# Patient Record
Sex: FEMALE | Race: WHITE | Employment: FULL TIME | URBAN - METROPOLITAN AREA
[De-identification: names, ages, dates, MRNs, and addresses within clinical notes are randomized per-mention and may not be internally consistent; named-entity substitution may affect disease eponyms.]

---

## 2023-02-17 PROBLEM — M54.9 CHRONIC BACK PAIN: Status: ACTIVE | Noted: 2023-02-17

## 2023-02-17 PROBLEM — G47.33 OBSTRUCTIVE SLEEP APNEA: Status: ACTIVE | Noted: 2023-02-17

## 2023-02-17 PROBLEM — I10 ESSENTIAL HYPERTENSION: Status: ACTIVE | Noted: 2023-02-17

## 2023-02-17 PROBLEM — G47.20 CIRCADIAN RHYTHM SLEEP DISORDER OF NONORGANIC ORIGIN: Status: ACTIVE | Noted: 2023-02-17

## 2023-02-17 PROBLEM — R91.8 MULTIPLE NODULES OF LUNG: Status: ACTIVE | Noted: 2023-02-17

## 2023-02-17 PROBLEM — R42 POSTURAL DIZZINESS: Status: ACTIVE | Noted: 2023-02-17

## 2023-02-17 PROBLEM — F32.A DEPRESSION: Status: ACTIVE | Noted: 2023-02-17

## 2023-02-17 PROBLEM — K76.89 LIVER CYST: Status: ACTIVE | Noted: 2023-02-17

## 2023-02-17 PROBLEM — G47.31 CENTRAL SLEEP APNEA: Status: ACTIVE | Noted: 2023-02-17

## 2023-02-17 PROBLEM — F41.9 ANXIETY: Status: ACTIVE | Noted: 2023-02-17

## 2023-02-17 PROBLEM — R42 DIZZINESS: Status: ACTIVE | Noted: 2023-02-17

## 2023-02-17 PROBLEM — E66.9 OBESITY: Status: ACTIVE | Noted: 2023-02-17

## 2023-02-17 PROBLEM — G89.29 CHRONIC BACK PAIN: Status: ACTIVE | Noted: 2023-02-17

## 2023-02-17 PROBLEM — F51.04 CHRONIC INSOMNIA: Status: ACTIVE | Noted: 2023-02-17

## 2023-02-17 PROBLEM — G47.09 OTHER INSOMNIA: Status: ACTIVE | Noted: 2023-02-17

## 2023-02-17 PROBLEM — R42 INTERMITTENT LIGHTHEADEDNESS: Status: ACTIVE | Noted: 2023-02-17

## 2023-02-17 PROBLEM — J45.909 REACTIVE AIRWAY DISEASE (HHS-HCC): Status: ACTIVE | Noted: 2023-02-17

## 2023-02-17 PROBLEM — F51.8 CIRCADIAN RHYTHM SLEEP DISORDER OF NONORGANIC ORIGIN: Status: ACTIVE | Noted: 2023-02-17

## 2023-02-17 PROBLEM — R26.89 BALANCE PROBLEM: Status: ACTIVE | Noted: 2023-02-17

## 2023-02-17 PROBLEM — S86.912A: Status: ACTIVE | Noted: 2023-02-17

## 2023-02-17 PROBLEM — Z92.89 H/O CARDIOVASCULAR STRESS TEST: Status: RESOLVED | Noted: 2023-02-17 | Resolved: 2023-02-17

## 2023-02-17 PROBLEM — R05.9 COUGH: Status: ACTIVE | Noted: 2023-02-17

## 2023-02-17 PROBLEM — J30.9 ALLERGIC RHINITIS: Status: ACTIVE | Noted: 2023-02-17

## 2023-02-17 PROBLEM — R91.1 LUNG NODULE: Status: ACTIVE | Noted: 2023-02-17

## 2023-02-17 PROBLEM — R55 NEAR SYNCOPE: Status: ACTIVE | Noted: 2023-02-17

## 2023-02-17 RX ORDER — AMLODIPINE BESYLATE 5 MG/1
1 TABLET ORAL DAILY
COMMUNITY
Start: 2012-03-21 | End: 2023-06-28 | Stop reason: SDUPTHER

## 2023-02-17 RX ORDER — CHOLECALCIFEROL (VITAMIN D3) 50 MCG
50 TABLET ORAL DAILY
COMMUNITY

## 2023-02-17 RX ORDER — FLUTICASONE PROPIONATE 50 MCG
SPRAY, SUSPENSION (ML) NASAL
COMMUNITY
Start: 2020-11-30

## 2023-02-17 RX ORDER — MINERAL OIL
ENEMA (ML) RECTAL
COMMUNITY
Start: 2020-11-30

## 2023-02-17 RX ORDER — LAMOTRIGINE 25 MG/1
TABLET ORAL
COMMUNITY
Start: 2020-11-30

## 2023-02-17 RX ORDER — GUAIFENESIN AND DEXTROMETHORPHAN HYDROBROMIDE 1200; 60 MG/1; MG/1
TABLET, EXTENDED RELEASE ORAL
COMMUNITY
Start: 2019-04-09

## 2023-02-17 RX ORDER — ACEBUTOLOL HYDROCHLORIDE 200 MG/1
1 CAPSULE ORAL DAILY
COMMUNITY
Start: 2016-09-13 | End: 2023-06-20

## 2023-02-17 RX ORDER — CLONAZEPAM 1 MG/1
0.5 TABLET ORAL
COMMUNITY
Start: 2016-09-13 | End: 2024-05-01 | Stop reason: SDUPTHER

## 2023-02-17 RX ORDER — ACETAMINOPHEN 160 MG/5ML
1 SUSPENSION, ORAL (FINAL DOSE FORM) ORAL DAILY
COMMUNITY
Start: 2019-04-09

## 2023-02-17 RX ORDER — ARIPIPRAZOLE 15 MG/1
7.5 TABLET ORAL DAILY
COMMUNITY
Start: 2022-07-14

## 2023-02-17 RX ORDER — ESCITALOPRAM OXALATE 20 MG/1
1 TABLET ORAL DAILY
COMMUNITY
Start: 2021-08-31

## 2023-03-30 ENCOUNTER — OFFICE VISIT (OUTPATIENT)
Dept: PRIMARY CARE | Facility: CLINIC | Age: 63
End: 2023-03-30
Payer: COMMERCIAL

## 2023-03-30 VITALS
OXYGEN SATURATION: 96 % | HEART RATE: 66 BPM | SYSTOLIC BLOOD PRESSURE: 128 MMHG | RESPIRATION RATE: 16 BRPM | TEMPERATURE: 97.5 F | DIASTOLIC BLOOD PRESSURE: 78 MMHG | BODY MASS INDEX: 35.97 KG/M2 | WEIGHT: 243.6 LBS

## 2023-03-30 DIAGNOSIS — F32.0 CURRENT MILD EPISODE OF MAJOR DEPRESSIVE DISORDER WITHOUT PRIOR EPISODE (CMS-HCC): ICD-10-CM

## 2023-03-30 DIAGNOSIS — E66.01 SEVERE OBESITY (BMI 35.0-35.9 WITH COMORBIDITY) (MULTI): ICD-10-CM

## 2023-03-30 DIAGNOSIS — G47.33 OBSTRUCTIVE SLEEP APNEA: ICD-10-CM

## 2023-03-30 DIAGNOSIS — I10 ESSENTIAL HYPERTENSION: Primary | ICD-10-CM

## 2023-03-30 DIAGNOSIS — Z12.31 ENCOUNTER FOR SCREENING MAMMOGRAM FOR MALIGNANT NEOPLASM OF BREAST: ICD-10-CM

## 2023-03-30 PROBLEM — F41.9 ANXIETY: Status: RESOLVED | Noted: 2023-02-17 | Resolved: 2023-03-30

## 2023-03-30 PROBLEM — G89.29 CHRONIC BACK PAIN: Status: RESOLVED | Noted: 2023-02-17 | Resolved: 2023-03-30

## 2023-03-30 PROBLEM — M54.9 CHRONIC BACK PAIN: Status: RESOLVED | Noted: 2023-02-17 | Resolved: 2023-03-30

## 2023-03-30 PROBLEM — R55 NEAR SYNCOPE: Status: RESOLVED | Noted: 2023-02-17 | Resolved: 2023-03-30

## 2023-03-30 PROBLEM — R42 INTERMITTENT LIGHTHEADEDNESS: Status: RESOLVED | Noted: 2023-02-17 | Resolved: 2023-03-30

## 2023-03-30 PROBLEM — S86.912A: Status: RESOLVED | Noted: 2023-02-17 | Resolved: 2023-03-30

## 2023-03-30 PROBLEM — R42 POSTURAL DIZZINESS: Status: RESOLVED | Noted: 2023-02-17 | Resolved: 2023-03-30

## 2023-03-30 PROBLEM — G47.31 CENTRAL SLEEP APNEA: Status: RESOLVED | Noted: 2023-02-17 | Resolved: 2023-03-30

## 2023-03-30 PROBLEM — R42 DIZZINESS: Status: RESOLVED | Noted: 2023-02-17 | Resolved: 2023-03-30

## 2023-03-30 PROBLEM — R26.89 BALANCE PROBLEM: Status: RESOLVED | Noted: 2023-02-17 | Resolved: 2023-03-30

## 2023-03-30 PROCEDURE — 99214 OFFICE O/P EST MOD 30 MIN: CPT | Performed by: INTERNAL MEDICINE

## 2023-03-30 PROCEDURE — 3074F SYST BP LT 130 MM HG: CPT | Performed by: INTERNAL MEDICINE

## 2023-03-30 PROCEDURE — 3008F BODY MASS INDEX DOCD: CPT | Performed by: INTERNAL MEDICINE

## 2023-03-30 PROCEDURE — 3078F DIAST BP <80 MM HG: CPT | Performed by: INTERNAL MEDICINE

## 2023-03-30 PROCEDURE — 1036F TOBACCO NON-USER: CPT | Performed by: INTERNAL MEDICINE

## 2023-03-30 ASSESSMENT — ENCOUNTER SYMPTOMS
PALPITATIONS: 0
SHORTNESS OF BREATH: 0
APPETITE CHANGE: 0
ACTIVITY CHANGE: 0
COUGH: 0

## 2023-03-30 NOTE — PROGRESS NOTES
The patient is here today for a follow up  appointment.Subjective   Patient ID: Romi Vicente is a 62 y.o. female who presents for Follow-up.  Here for follow up  Feels well  No complaints    Has been crafting again        Review of Systems   Constitutional:  Negative for activity change and appetite change.   Respiratory:  Negative for cough and shortness of breath.    Cardiovascular:  Negative for chest pain, palpitations and leg swelling.       Objective   Physical Exam  Vitals and nursing note reviewed.   Cardiovascular:      Rate and Rhythm: Normal rate and regular rhythm.   Pulmonary:      Effort: Pulmonary effort is normal.      Breath sounds: Normal breath sounds.         Assessment/Plan   Problem List Items Addressed This Visit       Obstructive sleep apnea    Overview     PSG at  revealed moderate HONEY with an AHI 27.9 O2 sat behzad 81%         Depression    Current Assessment & Plan     Stable         Essential hypertension - Primary    Current Assessment & Plan     Stable         Severe obesity (BMI 35.0-35.9 with comorbidity) (CMS/HCC)    Current Assessment & Plan     Applauded success          Other Visit Diagnoses       Encounter for screening mammogram for malignant neoplasm of breast

## 2023-06-20 DIAGNOSIS — I10 ESSENTIAL HYPERTENSION: Primary | ICD-10-CM

## 2023-06-20 RX ORDER — ACEBUTOLOL HYDROCHLORIDE 200 MG/1
CAPSULE ORAL DAILY
Qty: 90 CAPSULE | Refills: 3 | Status: SHIPPED | OUTPATIENT
Start: 2023-06-20 | End: 2023-08-03 | Stop reason: SDUPTHER

## 2023-06-28 DIAGNOSIS — Z00.00 HEALTHCARE MAINTENANCE: ICD-10-CM

## 2023-06-28 DIAGNOSIS — Z00.00 HEALTH MAINTENANCE EXAMINATION: Primary | ICD-10-CM

## 2023-06-28 DIAGNOSIS — I10 ESSENTIAL HYPERTENSION: ICD-10-CM

## 2023-06-28 RX ORDER — AMLODIPINE BESYLATE 5 MG/1
5 TABLET ORAL DAILY
Qty: 90 TABLET | Refills: 1 | Status: SHIPPED | OUTPATIENT
Start: 2023-06-28 | End: 2023-08-03 | Stop reason: SDUPTHER

## 2023-08-02 ENCOUNTER — LAB (OUTPATIENT)
Dept: LAB | Facility: LAB | Age: 63
End: 2023-08-02
Payer: COMMERCIAL

## 2023-08-02 DIAGNOSIS — Z00.00 HEALTHCARE MAINTENANCE: ICD-10-CM

## 2023-08-02 DIAGNOSIS — Z00.00 HEALTH MAINTENANCE EXAMINATION: ICD-10-CM

## 2023-08-02 LAB
ALANINE AMINOTRANSFERASE (SGPT) (U/L) IN SER/PLAS: 48 U/L (ref 7–45)
ALBUMIN (G/DL) IN SER/PLAS: 4.1 G/DL (ref 3.4–5)
ALKALINE PHOSPHATASE (U/L) IN SER/PLAS: 120 U/L (ref 33–136)
ANION GAP IN SER/PLAS: 11 MMOL/L (ref 10–20)
ASPARTATE AMINOTRANSFERASE (SGOT) (U/L) IN SER/PLAS: 26 U/L (ref 9–39)
BILIRUBIN TOTAL (MG/DL) IN SER/PLAS: 0.6 MG/DL (ref 0–1.2)
CALCIUM (MG/DL) IN SER/PLAS: 9.5 MG/DL (ref 8.6–10.3)
CARBON DIOXIDE, TOTAL (MMOL/L) IN SER/PLAS: 29 MMOL/L (ref 21–32)
CHLORIDE (MMOL/L) IN SER/PLAS: 105 MMOL/L (ref 98–107)
CHOLESTEROL (MG/DL) IN SER/PLAS: 159 MG/DL (ref 0–199)
CHOLESTEROL IN HDL (MG/DL) IN SER/PLAS: 46 MG/DL
CHOLESTEROL/HDL RATIO: 3.5
CREATININE (MG/DL) IN SER/PLAS: 1.08 MG/DL (ref 0.5–1.05)
ERYTHROCYTE DISTRIBUTION WIDTH (RATIO) BY AUTOMATED COUNT: 13 % (ref 11.5–14.5)
ERYTHROCYTE MEAN CORPUSCULAR HEMOGLOBIN CONCENTRATION (G/DL) BY AUTOMATED: 32.6 G/DL (ref 32–36)
ERYTHROCYTE MEAN CORPUSCULAR VOLUME (FL) BY AUTOMATED COUNT: 91 FL (ref 80–100)
ERYTHROCYTES (10*6/UL) IN BLOOD BY AUTOMATED COUNT: 4.69 X10E12/L (ref 4–5.2)
ESTIMATED AVERAGE GLUCOSE FOR HBA1C: 117 MG/DL
GFR FEMALE: 58 ML/MIN/1.73M2
GLUCOSE (MG/DL) IN SER/PLAS: 89 MG/DL (ref 74–99)
HEMATOCRIT (%) IN BLOOD BY AUTOMATED COUNT: 42.7 % (ref 36–46)
HEMOGLOBIN (G/DL) IN BLOOD: 13.9 G/DL (ref 12–16)
HEMOGLOBIN A1C/HEMOGLOBIN TOTAL IN BLOOD: 5.7 %
LDL: 84 MG/DL (ref 0–99)
LEUKOCYTES (10*3/UL) IN BLOOD BY AUTOMATED COUNT: 5.5 X10E9/L (ref 4.4–11.3)
PLATELETS (10*3/UL) IN BLOOD AUTOMATED COUNT: 220 X10E9/L (ref 150–450)
POTASSIUM (MMOL/L) IN SER/PLAS: 4.2 MMOL/L (ref 3.5–5.3)
PROTEIN TOTAL: 6.7 G/DL (ref 6.4–8.2)
SODIUM (MMOL/L) IN SER/PLAS: 141 MMOL/L (ref 136–145)
THYROTROPIN (MIU/L) IN SER/PLAS BY DETECTION LIMIT <= 0.05 MIU/L: 3.81 MIU/L (ref 0.44–3.98)
TRIGLYCERIDE (MG/DL) IN SER/PLAS: 143 MG/DL (ref 0–149)
UREA NITROGEN (MG/DL) IN SER/PLAS: 16 MG/DL (ref 6–23)
VLDL: 29 MG/DL (ref 0–40)

## 2023-08-02 PROCEDURE — 36415 COLL VENOUS BLD VENIPUNCTURE: CPT

## 2023-08-02 PROCEDURE — 80053 COMPREHEN METABOLIC PANEL: CPT

## 2023-08-02 PROCEDURE — 85027 COMPLETE CBC AUTOMATED: CPT

## 2023-08-02 PROCEDURE — 80061 LIPID PANEL: CPT

## 2023-08-02 PROCEDURE — 83036 HEMOGLOBIN GLYCOSYLATED A1C: CPT

## 2023-08-02 PROCEDURE — 84443 ASSAY THYROID STIM HORMONE: CPT

## 2023-08-03 ENCOUNTER — OFFICE VISIT (OUTPATIENT)
Dept: PRIMARY CARE | Facility: CLINIC | Age: 63
End: 2023-08-03
Payer: COMMERCIAL

## 2023-08-03 VITALS
WEIGHT: 240 LBS | OXYGEN SATURATION: 97 % | HEIGHT: 69 IN | HEART RATE: 70 BPM | RESPIRATION RATE: 16 BRPM | DIASTOLIC BLOOD PRESSURE: 74 MMHG | SYSTOLIC BLOOD PRESSURE: 122 MMHG | BODY MASS INDEX: 35.55 KG/M2 | TEMPERATURE: 97.2 F

## 2023-08-03 DIAGNOSIS — F32.A DEPRESSION, UNSPECIFIED DEPRESSION TYPE: Primary | ICD-10-CM

## 2023-08-03 DIAGNOSIS — Z00.00 HEALTH CARE MAINTENANCE: ICD-10-CM

## 2023-08-03 DIAGNOSIS — I10 ESSENTIAL HYPERTENSION: ICD-10-CM

## 2023-08-03 PROCEDURE — 1036F TOBACCO NON-USER: CPT | Performed by: INTERNAL MEDICINE

## 2023-08-03 PROCEDURE — 99396 PREV VISIT EST AGE 40-64: CPT | Performed by: INTERNAL MEDICINE

## 2023-08-03 PROCEDURE — 3078F DIAST BP <80 MM HG: CPT | Performed by: INTERNAL MEDICINE

## 2023-08-03 PROCEDURE — 3008F BODY MASS INDEX DOCD: CPT | Performed by: INTERNAL MEDICINE

## 2023-08-03 PROCEDURE — 3074F SYST BP LT 130 MM HG: CPT | Performed by: INTERNAL MEDICINE

## 2023-08-03 PROCEDURE — 93000 ELECTROCARDIOGRAM COMPLETE: CPT | Performed by: INTERNAL MEDICINE

## 2023-08-03 RX ORDER — ACEBUTOLOL HYDROCHLORIDE 200 MG/1
200 CAPSULE ORAL DAILY
Qty: 90 CAPSULE | Refills: 3 | Status: SHIPPED | OUTPATIENT
Start: 2023-08-03

## 2023-08-03 RX ORDER — LAMOTRIGINE 200 MG/1
TABLET ORAL
COMMUNITY

## 2023-08-03 RX ORDER — AMLODIPINE BESYLATE 5 MG/1
5 TABLET ORAL DAILY
Qty: 90 TABLET | Refills: 3 | Status: SHIPPED | OUTPATIENT
Start: 2023-08-03

## 2023-08-03 ASSESSMENT — PATIENT HEALTH QUESTIONNAIRE - PHQ9
2. FEELING DOWN, DEPRESSED OR HOPELESS: NOT AT ALL
1. LITTLE INTEREST OR PLEASURE IN DOING THINGS: NOT AT ALL
SUM OF ALL RESPONSES TO PHQ9 QUESTIONS 1 AND 2: 0

## 2023-08-03 ASSESSMENT — ENCOUNTER SYMPTOMS
CONSTIPATION: 0
NAUSEA: 0
VOICE CHANGE: 0
PALPITATIONS: 0
HEMATURIA: 0
TROUBLE SWALLOWING: 0
DIFFICULTY URINATING: 0
DIARRHEA: 0
ACTIVITY CHANGE: 0
APPETITE CHANGE: 0
WHEEZING: 0
HEADACHES: 0
UNEXPECTED WEIGHT CHANGE: 0
BLOOD IN STOOL: 0
DIZZINESS: 0
VOMITING: 0
COUGH: 0
LIGHT-HEADEDNESS: 0
FATIGUE: 0
CHEST TIGHTNESS: 0
ARTHRALGIAS: 0
TREMORS: 0

## 2023-08-03 NOTE — PROGRESS NOTES
Received notification by Isabela Rogers RN on 7/12/21 that pt has triggered for oncology nutrition care (reason for referral: Malnutrition Screening Tool (MST) Triggers: scored a 3 indicating 24-33# (11-15 kg) recent wt loss and is not eating poorly due to a decreased appetite  (Date of MST: 7/12/21))  Contacted Debi's daughter Kieran Poole today to establish care  No answer  Left voice message with the reason for today's call and this RDs contact information asking that she call back as able/desired  The patient is here today for a physical exam.Subjective   Patient ID: Romi Vicente is a 63 y.o. female who presents for Annual Exam.  64 yo here for a physical  Started Noom last week - lost 3 lbs last week    Works at [employer]  Single  Nonsmoker  Alcohol- occ  Marijuana- denies  No exercise      Review of Systems   Constitutional:  Negative for activity change, appetite change, fatigue and unexpected weight change.   HENT:  Negative for ear pain, hearing loss, tinnitus, trouble swallowing and voice change.    Eyes:  Negative for visual disturbance.   Respiratory:  Negative for cough, chest tightness and wheezing.    Cardiovascular:  Negative for chest pain, palpitations and leg swelling.   Gastrointestinal:  Negative for blood in stool, constipation, diarrhea, nausea and vomiting.   Genitourinary:  Negative for difficulty urinating, hematuria and vaginal bleeding.   Musculoskeletal:  Negative for arthralgias.   Skin:  Negative for rash.   Neurological:  Negative for dizziness, tremors, syncope, light-headedness and headaches.       Objective   Physical Exam  Constitutional:       General: She is not in acute distress.     Appearance: She is well-developed. She is not diaphoretic.   HENT:      Head: Normocephalic.      Right Ear: Tympanic membrane normal. There is no impacted cerumen.      Left Ear: Tympanic membrane normal. There is no impacted cerumen.      Nose: Nose normal.      Mouth/Throat:      Mouth: Mucous membranes are moist.      Pharynx: Oropharynx is clear. No oropharyngeal exudate or posterior oropharyngeal erythema.   Eyes:      General: No scleral icterus.     Extraocular Movements: Extraocular movements intact.      Conjunctiva/sclera: Conjunctivae normal.      Pupils: Pupils are equal, round, and reactive to light.   Neck:      Thyroid: No thyromegaly.      Vascular: No JVD.   Cardiovascular:      Rate and Rhythm: Normal rate and regular rhythm.      Pulses: Normal pulses.      Heart sounds:  Normal heart sounds. No murmur heard.     No friction rub. No gallop.   Pulmonary:      Effort: Pulmonary effort is normal. No respiratory distress.      Breath sounds: Normal breath sounds. No wheezing or rales.   Chest:      Chest wall: No tenderness.   Abdominal:      General: Bowel sounds are normal. There is no distension.      Palpations: Abdomen is soft. There is no mass.      Tenderness: There is no abdominal tenderness. There is no rebound.   Musculoskeletal:         General: Normal range of motion.      Cervical back: Normal range of motion and neck supple.   Lymphadenopathy:      Cervical: No cervical adenopathy.   Skin:     General: Skin is warm and dry.   Neurological:      General: No focal deficit present.      Mental Status: She is alert and oriented to person, place, and time.      Deep Tendon Reflexes: Reflexes normal.   Psychiatric:         Mood and Affect: Mood normal.         Thought Content: Thought content normal.       Assessment/Plan   Problem List Items Addressed This Visit       Depression - Primary    Current Assessment & Plan     Sees psychiatry         Essential hypertension    Current Assessment & Plan     Stable         Relevant Medications    acebutolol (Sectral) 200 mg capsule    amLODIPine (Norvasc) 5 mg tablet     Other Visit Diagnoses       Health care maintenance        Relevant Orders    ECG 12 lead (Clinic Performed)         Discussed heart healthy lifestyle

## 2024-02-01 ENCOUNTER — OFFICE VISIT (OUTPATIENT)
Dept: PRIMARY CARE | Facility: CLINIC | Age: 64
End: 2024-02-01
Payer: COMMERCIAL

## 2024-02-01 VITALS
BODY MASS INDEX: 37.29 KG/M2 | WEIGHT: 251.8 LBS | TEMPERATURE: 97.1 F | OXYGEN SATURATION: 95 % | DIASTOLIC BLOOD PRESSURE: 79 MMHG | SYSTOLIC BLOOD PRESSURE: 140 MMHG | HEIGHT: 69 IN | HEART RATE: 67 BPM

## 2024-02-01 DIAGNOSIS — Z13.5 DIABETIC RETINOPATHY SCREENING: Primary | ICD-10-CM

## 2024-02-01 PROBLEM — H66.90 ACUTE OTITIS MEDIA: Status: ACTIVE | Noted: 2024-02-01

## 2024-02-01 PROBLEM — R31.9 HEMATURIA: Status: ACTIVE | Noted: 2024-02-01

## 2024-02-01 PROBLEM — Z86.59 HISTORY OF DEPRESSION: Status: ACTIVE | Noted: 2024-02-01

## 2024-02-01 PROBLEM — Z20.822 SUSPECTED SEVERE ACUTE RESPIRATORY SYNDROME CORONAVIRUS 2 (SARS-COV-2) INFECTION: Status: ACTIVE | Noted: 2022-07-14

## 2024-02-01 PROBLEM — Z86.79 HISTORY OF HYPERTENSION: Status: ACTIVE | Noted: 2024-02-01

## 2024-02-01 LAB — POC HEMOGLOBIN A1C: 6.4 % (ref 4.2–6.5)

## 2024-02-01 PROCEDURE — 1036F TOBACCO NON-USER: CPT | Performed by: INTERNAL MEDICINE

## 2024-02-01 PROCEDURE — 83036 HEMOGLOBIN GLYCOSYLATED A1C: CPT | Performed by: INTERNAL MEDICINE

## 2024-02-01 PROCEDURE — 3077F SYST BP >= 140 MM HG: CPT | Performed by: INTERNAL MEDICINE

## 2024-02-01 PROCEDURE — 3008F BODY MASS INDEX DOCD: CPT | Performed by: INTERNAL MEDICINE

## 2024-02-01 PROCEDURE — 3078F DIAST BP <80 MM HG: CPT | Performed by: INTERNAL MEDICINE

## 2024-02-01 PROCEDURE — 99215 OFFICE O/P EST HI 40 MIN: CPT | Performed by: INTERNAL MEDICINE

## 2024-02-01 ASSESSMENT — PATIENT HEALTH QUESTIONNAIRE - PHQ9
SUM OF ALL RESPONSES TO PHQ9 QUESTIONS 1 AND 2: 0
1. LITTLE INTEREST OR PLEASURE IN DOING THINGS: NOT AT ALL
2. FEELING DOWN, DEPRESSED OR HOPELESS: NOT AT ALL

## 2024-02-01 NOTE — PROGRESS NOTES
"Subjective   Patient ID: Romi Vicente is a 63 y.o. female who presents for Follow-up.    HPI   Patient is a 64 yo F who presents for follow-up.    Feels well, but is concerned about weight. She has gained 11 pounds since last visit in August. Was using Noom in August and had lost and maintained weight at 240 until Thanksgiving then felt like she hit a plateau. She quit using Noom over the holidays and diet suffered. She has also not been exercising. Feels like she has an issue with binge eating, psych had suggested naltrexone for addictive eating. Patient not interested in being on weight loss medications.    Review of Systems  Negative except as noted in HPI    Objective   /79   Pulse 67   Temp 36.2 °C (97.1 °F)   Ht 1.759 m (5' 9.25\")   Wt 114 kg (251 lb 12.8 oz)   SpO2 95%   BMI 36.92 kg/m²     Physical Exam  Constitutional: NAD, pleasant  HEENT: EOMI, no scleral icterus  CV: RRR, no m/r/g  Pulm: CTAB, no increased WOB  GI: Soft, NT, ND  Extremities: no notable edema  Skin: warm  Neuro: grossly alert and oriented  Psych: Mood and affect appropriate to situation     Assessment/Plan   Patient is a 64 yo F who presents for follow-up.    #Obestiy  -weight today 251 from 240 in August  -discussed extensively weight loss strategies including diet and exerices and well as medication options  -patient interested in restarting noom again and holidng off on meds  -will reassess in 3 months    #Pre-Diabetic  -A1c in 8/23 was 5.7, repeat A1c today 6.4  -discussed need for lifestyle change   -repeat A1c in 3 months, if remains 6.4 or higher, will need to begin treatment for DM  -consider GLP-1 at that time for combination weight loss and DM    #Essential HTN  -/79  -continue acebutolol 200mg daily and amlodipine 5mg daily    #Depression  -follows with psych  -could consider resuming Wellbutrin for cravings and mood, discuss with provider    Na Dang MD  IM, PGY1  "

## 2024-02-01 NOTE — PROGRESS NOTES
Subjective   Patient ID: Romi Vicente is a 63 y.o. female who presents for Follow-up.    HPI   Patient is here for 6 months follow up    Review of Systems    Objective   There were no vitals taken for this visit.    Physical Exam    Assessment/Plan

## 2024-02-15 ASSESSMENT — SLEEP AND FATIGUE QUESTIONNAIRES
HOW LIKELY ARE YOU TO NOD OFF OR FALL ASLEEP IN A CAR, WHILE STOPPED FOR A FEW MINUTES IN TRAFFIC: NO CHANCE OF DOZING
HOW LIKELY ARE YOU TO NOD OFF OR FALL ASLEEP WHEN YOU ARE A PASSENGER IN A CAR FOR AN HOUR WITHOUT A BREAK: SLIGHT CHANCE OF DOZING
HOW LIKELY ARE YOU TO NOD OFF OR FALL ASLEEP WHILE WATCHING TV: SLIGHT CHANCE OF DOZING
HOW LIKELY ARE YOU TO NOD OFF OR FALL ASLEEP WHILE SITTING QUIETLY AFTER LUNCH WITHOUT ALCOHOL: SLIGHT CHANCE OF DOZING
HOW LIKELY ARE YOU TO NOD OFF OR FALL ASLEEP WHILE SITTING AND READING: SLIGHT CHANCE OF DOZING
SITING INACTIVE IN A PUBLIC PLACE LIKE A CLASS ROOM OR A MOVIE THEATER: SLIGHT CHANCE OF DOZING
HOW LIKELY ARE YOU TO NOD OFF OR FALL ASLEEP WHILE SITTING AND TALKING TO SOMEONE: NO CHANCE OF DOZING
ESS TOTAL SCORE: 8
HOW LIKELY ARE YOU TO NOD OFF OR FALL ASLEEP WHILE LYING DOWN TO REST IN THE AFTERNOON WHEN CIRCUMSTANCES PERMIT: HIGH CHANCE OF DOZING

## 2024-02-22 ENCOUNTER — OFFICE VISIT (OUTPATIENT)
Dept: SLEEP MEDICINE | Facility: CLINIC | Age: 64
End: 2024-02-22
Payer: COMMERCIAL

## 2024-02-22 VITALS
BODY MASS INDEX: 36.33 KG/M2 | HEIGHT: 69 IN | WEIGHT: 245.3 LBS | SYSTOLIC BLOOD PRESSURE: 108 MMHG | HEART RATE: 74 BPM | OXYGEN SATURATION: 94 % | DIASTOLIC BLOOD PRESSURE: 73 MMHG | RESPIRATION RATE: 18 BRPM

## 2024-02-22 DIAGNOSIS — G47.50 PARASOMNIA, UNSPECIFIED TYPE: ICD-10-CM

## 2024-02-22 DIAGNOSIS — G47.30 SLEEP APNEA, UNSPECIFIED TYPE: Primary | ICD-10-CM

## 2024-02-22 PROCEDURE — 1036F TOBACCO NON-USER: CPT | Performed by: GENERAL PRACTICE

## 2024-02-22 PROCEDURE — 3078F DIAST BP <80 MM HG: CPT | Performed by: GENERAL PRACTICE

## 2024-02-22 PROCEDURE — 99215 OFFICE O/P EST HI 40 MIN: CPT | Performed by: GENERAL PRACTICE

## 2024-02-22 PROCEDURE — 3008F BODY MASS INDEX DOCD: CPT | Performed by: GENERAL PRACTICE

## 2024-02-22 PROCEDURE — 3074F SYST BP LT 130 MM HG: CPT | Performed by: GENERAL PRACTICE

## 2024-02-22 RX ORDER — BUPROPION HYDROCHLORIDE 100 MG/1
100 TABLET ORAL DAILY
COMMUNITY
End: 2024-05-02 | Stop reason: SDUPTHER

## 2024-02-22 ASSESSMENT — COLUMBIA-SUICIDE SEVERITY RATING SCALE - C-SSRS
2. HAVE YOU ACTUALLY HAD ANY THOUGHTS OF KILLING YOURSELF?: NO
1. IN THE PAST MONTH, HAVE YOU WISHED YOU WERE DEAD OR WISHED YOU COULD GO TO SLEEP AND NOT WAKE UP?: NO
6. HAVE YOU EVER DONE ANYTHING, STARTED TO DO ANYTHING, OR PREPARED TO DO ANYTHING TO END YOUR LIFE?: NO

## 2024-02-22 ASSESSMENT — SLEEP AND FATIGUE QUESTIONNAIRES
HOW LIKELY ARE YOU TO NOD OFF OR FALL ASLEEP WHILE SITTING AND READING: SLIGHT CHANCE OF DOZING
HOW LIKELY ARE YOU TO NOD OFF OR FALL ASLEEP WHILE SITTING AND TALKING TO SOMEONE: WOULD NEVER DOZE
HOW LIKELY ARE YOU TO NOD OFF OR FALL ASLEEP WHEN YOU ARE A PASSENGER IN A CAR FOR AN HOUR WITHOUT A BREAK: WOULD NEVER DOZE
HOW LIKELY ARE YOU TO NOD OFF OR FALL ASLEEP WHILE SITTING QUIETLY AFTER LUNCH WITHOUT ALCOHOL: WOULD NEVER DOZE
ESS-CHAD TOTAL SCORE: 7
HOW LIKELY ARE YOU TO NOD OFF OR FALL ASLEEP WHILE LYING DOWN TO REST IN THE AFTERNOON WHEN CIRCUMSTANCES PERMIT: HIGH CHANCE OF DOZING
HOW LIKELY ARE YOU TO NOD OFF OR FALL ASLEEP IN A CAR, WHILE STOPPED FOR A FEW MINUTES IN TRAFFIC: SLIGHT CHANCE OF DOZING
SITING INACTIVE IN A PUBLIC PLACE LIKE A CLASS ROOM OR A MOVIE THEATER: SLIGHT CHANCE OF DOZING
HOW LIKELY ARE YOU TO NOD OFF OR FALL ASLEEP WHILE WATCHING TV: SLIGHT CHANCE OF DOZING

## 2024-02-22 ASSESSMENT — ENCOUNTER SYMPTOMS
LOSS OF SENSATION IN FEET: 0
DEPRESSION: 0
OCCASIONAL FEELINGS OF UNSTEADINESS: 0

## 2024-02-22 ASSESSMENT — PATIENT HEALTH QUESTIONNAIRE - PHQ9
2. FEELING DOWN, DEPRESSED OR HOPELESS: NOT AT ALL
SUM OF ALL RESPONSES TO PHQ9 QUESTIONS 1 AND 2: 0
1. LITTLE INTEREST OR PLEASURE IN DOING THINGS: NOT AT ALL

## 2024-02-22 NOTE — PROGRESS NOTES
Patient: Romi Vicente    [id number]  : 1960 -- AGE 63 y.o.    Provider: David Warren DO     [facility]   Service Date: 2024              [facility]  Follow up Visit Note      HISTORY OF PRESENT ILLNESS     HISTORY OF PRESENT ILLNESS   Romi Vicente is a 63 y.o. female who presents to a [facility] for a sleep medicine evaluation with concerns of one year follow up.     The patient  has a past medical history of Acute sinusitis, unspecified (2019), Encounter for examination of eyes and vision without abnormal findings, Encounter for gynecological examination (general) (routine) without abnormal findings (2012), Encounter for gynecological examination (general) (routine) without abnormal findings, Encounter for screening mammogram for malignant neoplasm of breast, Encounter for screening mammogram for malignant neoplasm of breast, H/O cardiovascular stress test (2023), Other abnormal and inconclusive findings on diagnostic imaging of breast (2014), Personal history of other diseases of the circulatory system, Personal history of other diseases of the circulatory system, Personal history of other diseases of the nervous system and sense organs (2021), Personal history of other diseases of the respiratory system (2021), Personal history of other mental and behavioral disorders, Personal history of other specified conditions, and Syncope and collapse..    PAST SLEEP HISTORY  F with pmhx including allergic rhinitis, anxiety/depression (controlled), central sleep apnea on ASV.      Patient evaluated for sleep apnea at request of cardiologist d/t dizziness,   She states that she has been using her pap therapy regularly with no concerns.   Was seen by Dr. Lott for insomnia and circadian rhythm disorder, she reports she was stated on Abilify, 5mg and Lexapro 20 mg daily in the  summer/fall of 2021.       She reports she feels good with her PAP therapy.    CURRENT HISTORY    On today's visit, 2/22/2024, the patient reports that her machine makes a wheezing noise when she fills her humidifier chamber; thus she stopped using her humidity which caused her to have a dry mouth.       PAP Related Problems: no leak perceived, yes dry mouth when she is not using humidifier, no skin irritation from mask and no snoring with PAP.   Perceived Benefits of PAP Therapy: refreshing sleep, decreased nocturia, decreased dry mouth or sore throat, decreased nocturnal awakenings and decreased snoring/ choking/ gasping.     Sleep schedule  on weekdays / work days:  Usual Bedtime: 9-10pm  Sleep latency: few min  Wake time : 6 am  Total sleep time average/day:8 hours/day  Awakenings: 3-4x per night, unknown, short  Naps: 1x per month       Sleep schedule  on weekends/non work days :  Same as above.   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     Preferred sleeping position: SLEEP POSITION: sidelying    Sleep-related ROS:    Snoring: n   Witnessed apneas:     n   Gasping/ choking: n     Am Dry mouth:    yes without the humidifier.          Nasal congestion:   n      am headaches: n    Sleep is described as refreshing .     Daytime sleepiness: rarely  Fatigue or decreased energy: rarely  Difficulty remembering things in daytime: sometimes   Difficulty staying focused in daytime: : n  Irritable during the day: n  Drowsy driving: n  Hx of car accident: n  Near-miss Car accident: n      RLS screen:  RLSSCREEN: - Sensations: Patient has unusual sensations in their extremities that cause an urge to move them   Few times per month. Improves with movement. Around bedtime.     Sleep-related behaviors: sleep talks; Unsure if she acts out her dreams, lives alone but thinks she may be sitting up in bed. Only happened 1x in lifetime in early 2024.    Sleep environment:  Bed  partner :  n  Pets in bed :   n    Daytime Symptoms    ESS: 7       REVIEW OF SYSTEMS     REVIEW OF SYSTEMS  Review of Systems   All other systems reviewed and are negative.        ALLERGIES AND MEDICATIONS     ALLERGIES  Allergies   Allergen Reactions    House Dust Unknown    Methimazole Other     Never been tested, not 100% sure if they are allergic and has had the flu shot in the past.    Mold Unknown    Other Unknown     Pollen    Pseudoephedrine Unknown    Sulfa (Sulfonamide Antibiotics) Unknown    Thimerosal Other     Not sure    Naproxen Rash       MEDICATIONS  Current Outpatient Medications   Medication Sig Dispense Refill    acebutolol (Sectral) 200 mg capsule Take 1 capsule (200 mg) by mouth once daily. For essential hypertension 90 capsule 3    amLODIPine (Norvasc) 5 mg tablet Take 1 tablet (5 mg) by mouth once daily. For essential hypertension 90 tablet 3    ARIPiprazole (Abilify) 15 mg tablet Take 0.5 tablets (7.5 mg) by mouth once daily. For SocHx alcohol use      BIOTIN ORAL Take 10 mg by mouth. Capsule, for SocHx Alcohol use history      buPROPion (Wellbutrin) 100 mg tablet Take 1 tablet (100 mg) by mouth once daily.      cholecalciferol (Vitamin D-3) 50 MCG (2000 UT) tablet Take 1 tablet (50 mcg) by mouth once daily. For health maintenance      CHONDROITIN SULFATE A ORAL Take 1 capsule by mouth once daily. For SocHx alcohol use history      coenzyme Q-10 200 mg capsule Take 1 capsule (200 mg) by mouth once daily. For health maintenance      dextromethorphan-guaifenesin (MUCINEX DM) 60-1,200 mg tablet extended release 12 hr Take by mouth.      escitalopram (Lexapro) 20 mg tablet Take 1 tablet (20 mg) by mouth once daily.      fexofenadine (Allegra) 180 mg tablet Take by mouth. For health maintenace      fluticasone (Flonase) 50 mcg/actuation nasal spray Administer into affected nostril(s). For health maintenace      lamoTRIgine (LaMICtal) 200 mg tablet Take by mouth. One daily along with 25mg for a  total of 225mg      lamoTRIgine (LaMICtal) 25 mg tablet Take by mouth. Take 1 tablet daily also with 200 mg for a total of 225 mg daily for depression      B complex-vitamin C-folic acid (Nephro-Brooke Rx) 1- mg-mg-mcg tablet Take 1 tablet by mouth once daily with breakfast.      clonazePAM (KlonoPIN) 1 mg tablet Take 0.5 tablets (0.5 mg) by mouth. As needed for depression       No current facility-administered medications for this visit.         PAST HISTORY     PAST MEDICAL HISTORY  She  has a past medical history of Acute sinusitis, unspecified (12/02/2019), Encounter for examination of eyes and vision without abnormal findings, Encounter for gynecological examination (general) (routine) without abnormal findings (11/13/2012), Encounter for gynecological examination (general) (routine) without abnormal findings, Encounter for screening mammogram for malignant neoplasm of breast, Encounter for screening mammogram for malignant neoplasm of breast, H/O cardiovascular stress test (02/17/2023), Other abnormal and inconclusive findings on diagnostic imaging of breast (12/04/2014), Personal history of other diseases of the circulatory system, Personal history of other diseases of the circulatory system, Personal history of other diseases of the nervous system and sense organs (03/08/2021), Personal history of other diseases of the respiratory system (03/08/2021), Personal history of other mental and behavioral disorders, Personal history of other specified conditions, and Syncope and collapse.    PAST SURGICAL HISTORY:  Past Surgical History:   Procedure Laterality Date    COLONOSCOPY  11/13/2012    Complete Colonoscopy    COLONOSCOPY  12/31/2012    Complete Colonoscopy    OTHER SURGICAL HISTORY  07/14/2022    Colonoscopy    OTHER SURGICAL HISTORY  11/13/2017    Cataract Extraction With Insertion Of Intraocular Lens       FAMILY HISTORY  Family History   Problem Relation Name Age of Onset    Atrial fibrillation  "Mother      Hypertension Mother      Hypothyroidism Mother      Leukemia Mother      Other (Other) Mother          Prolapsing Mitral Valve Leaflet Syndrome    Colon cancer Father           SOCIAL HISTORY  She  reports that she has never smoked. She has never used smokeless tobacco. She reports current alcohol use. She reports that she does not use drugs.   Caffeine consumption: 4 cups coffee throughout the day.      PHYSICAL EXAM     VITAL SIGNS: /73   Pulse 74   Resp 18   Ht 1.753 m (5' 9\")   Wt 111 kg (245 lb 4.8 oz)   SpO2 94%   BMI 36.22 kg/m²      PREVIOUS WEIGHTS:  Wt Readings from Last 3 Encounters:   02/22/24 111 kg (245 lb 4.8 oz)   02/01/24 114 kg (251 lb 12.8 oz)   08/03/23 109 kg (240 lb)       Physical Exam  Constitutional: Alert and oriented, cooperative, no obvious distress.   HENT: normocephalic.   Neurologic: AOx3.   psychiatric: appropriate mood and affect.  Integumentary: no significant rashes observed over pap mask area.         RESULTS/DATA         ASSESSMENT/PLAN     Ms. Vicente is a 63 y.o. female and  has a past medical history of Acute sinusitis, unspecified (12/02/2019), Encounter for examination of eyes and vision without abnormal findings, Encounter for gynecological examination (general) (routine) without abnormal findings (11/13/2012), Encounter for gynecological examination (general) (routine) without abnormal findings, Encounter for screening mammogram for malignant neoplasm of breast, Encounter for screening mammogram for malignant neoplasm of breast, H/O cardiovascular stress test (02/17/2023), Other abnormal and inconclusive findings on diagnostic imaging of breast (12/04/2014), Personal history of other diseases of the circulatory system, Personal history of other diseases of the circulatory system, Personal history of other diseases of the nervous system and sense organs (03/08/2021), Personal history of other diseases of the respiratory system (03/08/2021), Personal " history of other mental and behavioral disorders, Personal history of other specified conditions, and Syncope and collapse. She is following up on sleep apnea.       Problem List and Orders    F with pmhx including allergic rhinitis, anxiety/depression on clonazepam as needed( 1x per yr average) and lamotrigine in am, and Abilify (following with a provider who is managing her meds), central sleep apnea on ASV, HTN.      1- Central sleep apnea?  diagnostic study not available.   cpap titration 4/3/14-->return for ASV titration for persistent central apneas.   ASV titration 5/2/14-->min EPAP 5, max EPAP15, pressure max 25, PS 3-10.  echo 12/12/19-->EF 60-65%, normal systolic function, impaired relaxation pattern of LV diastolic filling.  -patient denies any history of stroke or diagnosis of heart failure, she denies taking any narcotics.   -patient states that she had an evaluation by neurology due to fainting? and central apneas?.      Reviewed and discussed the above sleep study results as well as download data and management options in details. All questions answered, patient verbalizes understanding.      -cont. ASVAuto, EPAP 5-15cwp, PS 3-10cwp; rAHI 0.5.   -patient will let us know if her cardiac status changes, ASV is not indicated in patients with reduced EF.   -ordered supplies  -wheezing noise with humidifier chamber filled --> ordered repair.   -ordered an echo for an updated EF last year but there was a concern about cost; patient will let us know if she becomes symptomatic or her cardiac status changes. She denies swelling or shortness of breath.      -do not drive or operate heavy machinery if drowsy.  -avoid sleeping on your back.   -avoid sedating substances/ medication, alcohol, illicit drugs and tobacco.     2- Insomnia  was following with Dr. Lott. and improved.   patient states that she feels that her Abilify helped with her sleep.      3- Obesity   counseled on eating a healthy diet and  exercising as tolerated.       4- parasomnia?   Unsure if she acts out her dreams, lives alone but thinks she may be sitting up in bed. Only happened 1x in lifetime in early 2024 only.   She will keep track of episodes and possible triggers.     -counseled on safety measures in details.     Please follow safety precautions. such as Pading of the sleeping area and surrounding furniture  Lower bd/ mattress to the floor and/or use a ground-floor bedroom. try to secure doors and windows (locks, alarms, barriers)  Remove sharp and dangerous objects from bedroom, including firearms  try to identify and avoid triggers.   Keep a log of  events.   try to avoid sleep disturbances, stress, keep a regular sleep schedule, use  your pap therapy regularly.   of note antipsychotics and lithium have the potential to precipitate parasomnias; patient needs to discuss with prescribing provider.         f/u 1 yr or sooner as needed

## 2024-02-22 NOTE — PATIENT INSTRUCTIONS
[facility] Sleep Medicine   [facility]  [address] [facility] [address]  [phone]       NAME: Romi Vicente   DATE: 2/22/2024     Your Sleep Provider Today: David Warren DO  Your Primary Care Physician: Gwen Coronado MD   Your Referring Provider: No ref. provider found    Thank you for coming to the Sleep Medicine Clinic today! Your sleep medicine provider today was: David Warren DO Below is a summary of your treatment plan, other important information, and our contact numbers:      TREATMENT PLAN     Follow up in 1yr or sooner as needed    Instructions - Common sleep apnea Recs: - For your sleep apnea, continue to use your PAP every night and use it whenever you are sleeping.   - Avoid alcohol or sedatives several hours prior to sleeping.   - Get additional supplies for your PAP (e.g., mask, hose, filters) every 3 months or as your insurance allows from your [email or web address] company. Replacement cushions for your PAP mask can be requested monthly if airseals are an issue.  - Remember to clean your mask, tubings, and water chamber regularly as instructed.  - Avoid driving or operating heavy machinery when drowsy. A person driving while sleepy is five (5) times more likely to have an accident. If you feel sleepy, pull over and take a short power nap (sleep for less than 30 minutes). Otherwise, ask somebody to drive you.    Please follow safety precautions. such as Pading of the sleeping area and surrounding furniture  Lower bd/ mattress to the floor and/or use a ground-floor bedroom. try to secure doors and windows (locks, alarms, barriers)  Remove sharp and dangerous objects from bedroom, including firearms  try to identify and avoid triggers.   Keep a log of events.   try to avoid sleep disturbances, stress, keep a regular sleep schedule, use  your pap therapy regularly.   of note antipsychotics and lithium have the potential to precipitate parasomnias; patient  needs to discuss with prescribing provider.    IMPORTANT INFORMATION     Call 911 for medical emergencies.  Our offices are generally open from Monday-Friday, 9 am - 5 pm.  If you need to get in touch with me, you may either call me and my team(number is below) or you can use [employer].  If a referral for a test, for CPAP, or for another specialist was made, and you have not heard about scheduling this within a week, please call scheduling at [phone] ([phone]).  If you are unable to make your appointment for clinic or an overnight study, kindly call the office at least 48 hours in advance to cancel and reschedule.  If you are on CPAP, please bring your device's card or the device to each clinic appointment.   There are no supporting services by either the sleep doctors or their staff on weekends and Holidays, or after 5 PM on weekdays.   If you have been asked to come to a sleep study, make sure you bring toiletries, a comfy pillow, and any nighttime medications that you may regularly take. Also be sure to eat dinner before you arrive. We generally do not provide meals.      PRESCRIPTIONS     We require 7 days advanced notice for prescription refills. If we do not receive the request in this time, we cannot guarantee that your medication will be refilled in time.      IMPORTANT PHONE NUMBERS     Sleep Medicine Clinic Fax: [phone]  Appointments (for Pediatric Sleep Clinic): [phone] - option 1  Appointments (for Adult Sleep Clinic): [phone] - option 2  Appointments (For Sleep Studies): [phone] - option 3  Behavioral Sleep Medicine: [phone]  Sleep Surgery: [phone]  ENT (Otolaryngology): [phone]  Headache Clinic (Neurology): [phone]  Neurology: [phone]  Psychiatry: [phone]  Pulmonary Function Testing (PFT) Center: [phone]  Pulmonary Medicine: [phone]  [employer] (DME): [phone]  [employer] (DME):  [phone]  [facility] (DME): [phone]      OUR ADULT SLEEP MEDICINE TEAM   Please do not hesitate to call the office or sleep nurse with any questions between appointments:    Adult Sleep Nurses (Marysol Baker, RN and Emma Oropeza, RN):  For clinical questions and refilling prescriptions: [phone]  Email sleep diaries and other documents at: [email or web address]    Adult Sleep Medicine Secretaries:  Emilie Todd (For [place]/Krise/Strohl/Yeh/Nieves):   P: [phone]  F: [phone]  Karlie Pandey (For Moss/Charla): P: [phone]  Fax: [phone]  Natalie Chinchilla (For Jurcevic/Blank): P: [phone]  F: [phone]  Judit Varela (For [place]): P: [phone]  F: [phone]  Fara Ratliff (For Sarah/Radha/Zakhary): P: [phone]  F: [phone]  Haritha Velasquez (For Abdullahi/Jaramillo): P: [phone]  F: [phone]     Adult Sleep Medicine Advanced Practice Providers:  Maneulito Lara ([place], [place])  Meghan Garcia ([facility])  Sylvie Dunham CNP ([place], [place], [place])  Rima Wheeler CNP ([place], [place], [place])  Nadiya Gutierrez ([place], [place], [place])  Caroline Jaramlilo CNP ([facility])        OUR SLEEP TESTING LOCATIONS     Our team will contact you to schedule your sleep study, however, you can contact us as follow:  Main Phone Line (scheduling only): [phone]), option 3  Adult and Pediatric Locations  [facility] (6 years and older): [facility] - 4th floor ([address] [place] [address]) After hours line: [phone]  [facility] [place] (Main campus: All ages): [address], 6th floor. After hours line: [phone]   [place] (5 years and older; younger considered on case-by-case basis): [address], [address]. Scheduling  After hours line: [phone]   [place] (6 years and older): [address]; Medical [address]  [address]; [address]   [place] (6 years and older): [address] [facility], [address]  After hours line: [phone]   [place] (13 years and older) in [place]: [address], 2nd floor  After hours line: [phone]   [place] (13 year and older): [address]  After hours line: [phone]     Adult Only Locations:   [place] (18 years and older): [address] [place] [address], 2nd floor   [place] (18 years and older): [address] [facility]; 4th floor  After hours line: [phone]  [facility] (18 years and older) at [place]: [address] [place] [address]  After hours line: [phone]          CONTACTING YOUR SLEEP MEDICINE PROVIDER     Send a message directly to your provider through \"My Chart\", which is the email service through your  Records Account: [email or web address]   Call [phone] and leave a message. One of the administrative assistants will forward the message to your sleep medicine provider through \"My Chart\" and/or email.     Your sleep medicine provider for this visit was: David Warren DO        "

## 2024-03-28 DIAGNOSIS — G47.33 OBSTRUCTIVE SLEEP APNEA: ICD-10-CM

## 2024-03-28 DIAGNOSIS — G47.31 CENTRAL SLEEP APNEA: Primary | ICD-10-CM

## 2024-03-28 NOTE — PROGRESS NOTES
Patient requesting an order for a new pap machine.     Ordered ASV Auto min EPAP 5, max EPAP15, PS 3-10.

## 2024-05-01 PROBLEM — R05.9 COUGH: Status: RESOLVED | Noted: 2023-02-17 | Resolved: 2024-05-01

## 2024-05-02 ENCOUNTER — OFFICE VISIT (OUTPATIENT)
Dept: PRIMARY CARE | Facility: CLINIC | Age: 64
End: 2024-05-02
Payer: COMMERCIAL

## 2024-05-02 VITALS
BODY MASS INDEX: 35.43 KG/M2 | HEART RATE: 63 BPM | WEIGHT: 239.2 LBS | DIASTOLIC BLOOD PRESSURE: 78 MMHG | SYSTOLIC BLOOD PRESSURE: 123 MMHG | TEMPERATURE: 97.1 F | HEIGHT: 69 IN | OXYGEN SATURATION: 96 %

## 2024-05-02 DIAGNOSIS — I10 ESSENTIAL HYPERTENSION: Primary | ICD-10-CM

## 2024-05-02 DIAGNOSIS — G47.33 OBSTRUCTIVE SLEEP APNEA: ICD-10-CM

## 2024-05-02 DIAGNOSIS — Z12.31 ENCOUNTER FOR SCREENING MAMMOGRAM FOR MALIGNANT NEOPLASM OF BREAST: ICD-10-CM

## 2024-05-02 DIAGNOSIS — Z00.00 ROUTINE GENERAL MEDICAL EXAMINATION AT A HEALTH CARE FACILITY: ICD-10-CM

## 2024-05-02 DIAGNOSIS — R73.03 PREDIABETES: ICD-10-CM

## 2024-05-02 DIAGNOSIS — Z13.5 DIABETIC RETINOPATHY SCREENING: ICD-10-CM

## 2024-05-02 DIAGNOSIS — E66.01 SEVERE OBESITY (BMI 35.0-35.9 WITH COMORBIDITY) (MULTI): ICD-10-CM

## 2024-05-02 PROBLEM — Z20.822 SUSPECTED SEVERE ACUTE RESPIRATORY SYNDROME CORONAVIRUS 2 (SARS-COV-2) INFECTION: Status: RESOLVED | Noted: 2022-07-14 | Resolved: 2024-05-02

## 2024-05-02 PROBLEM — Z86.59 HISTORY OF DEPRESSION: Status: RESOLVED | Noted: 2024-02-01 | Resolved: 2024-05-02

## 2024-05-02 PROBLEM — H66.90 ACUTE OTITIS MEDIA: Status: RESOLVED | Noted: 2024-02-01 | Resolved: 2024-05-02

## 2024-05-02 LAB — POC HEMOGLOBIN A1C: 6.1 % (ref 4.2–6.5)

## 2024-05-02 PROCEDURE — 83036 HEMOGLOBIN GLYCOSYLATED A1C: CPT | Performed by: INTERNAL MEDICINE

## 2024-05-02 PROCEDURE — 99214 OFFICE O/P EST MOD 30 MIN: CPT | Performed by: INTERNAL MEDICINE

## 2024-05-02 PROCEDURE — 3078F DIAST BP <80 MM HG: CPT | Performed by: INTERNAL MEDICINE

## 2024-05-02 PROCEDURE — 1036F TOBACCO NON-USER: CPT | Performed by: INTERNAL MEDICINE

## 2024-05-02 PROCEDURE — 3074F SYST BP LT 130 MM HG: CPT | Performed by: INTERNAL MEDICINE

## 2024-05-02 PROCEDURE — 3008F BODY MASS INDEX DOCD: CPT | Performed by: INTERNAL MEDICINE

## 2024-05-02 RX ORDER — BUPROPION HYDROCHLORIDE 300 MG/1
300 TABLET ORAL DAILY
COMMUNITY

## 2024-05-02 ASSESSMENT — ENCOUNTER SYMPTOMS
PALPITATIONS: 0
SHORTNESS OF BREATH: 0
APPETITE CHANGE: 0
ACTIVITY CHANGE: 0
COUGH: 0

## 2024-05-02 NOTE — PROGRESS NOTES
A pulse oximeter was placed on the patient's right thumb. Subjective   Patient ID: Romi Vicente is a 64 y.o. female who presents for Follow-up.  Here for follow up   Walking regularly  10lb weight loss    No complaints    Review of Systems   Constitutional:  Negative for activity change and appetite change.   Respiratory:  Negative for cough and shortness of breath.    Cardiovascular:  Negative for chest pain, palpitations and leg swelling.       Objective   Vitals:    05/02/24 0932   BP: 123/78   Pulse: 63   Temp: 36.2 °C (97.1 °F)   SpO2: 96%     Physical Exam  Vitals and nursing note reviewed.   Cardiovascular:      Rate and Rhythm: Normal rate and regular rhythm.      Heart sounds: Normal heart sounds.   Pulmonary:      Effort: Pulmonary effort is normal.      Breath sounds: Normal breath sounds.   Musculoskeletal:      Cervical back: Normal range of motion.   Neurological:      Mental Status: She is alert.         Assessment/Plan   Problem List Items Addressed This Visit       Obstructive sleep apnea    Overview     PSG at  revealed moderate HONEY with an AHI 27.9 O2 sat behzad 81%         Current Assessment & Plan     Uses CPAP regularly         Essential hypertension - Primary    Current Assessment & Plan     Stable         Severe obesity (BMI 35.0-35.9 with comorbidity) (Multi)     Other Visit Diagnoses       Diabetic retinopathy screening        Relevant Orders    POCT glycosylated hemoglobin (Hb A1C) manually resulted (Completed)    Routine general medical examination at a health care facility        Relevant Orders    CBC    Comprehensive Metabolic Panel    Lipid Panel    TSH with reflex to Free T4 if abnormal    Prediabetes        Improved    Relevant Orders    Hemoglobin A1C    Encounter for screening mammogram for malignant neoplasm of breast        Relevant Orders    BI mammo bilateral screening tomosynthesis         Follow up with me in Aug for CPE

## 2024-06-17 ENCOUNTER — APPOINTMENT (OUTPATIENT)
Dept: RADIOLOGY | Facility: CLINIC | Age: 64
End: 2024-06-17
Payer: COMMERCIAL

## 2024-06-19 ENCOUNTER — HOSPITAL ENCOUNTER (OUTPATIENT)
Dept: RADIOLOGY | Facility: CLINIC | Age: 64
Discharge: HOME | End: 2024-06-19
Payer: COMMERCIAL

## 2024-06-19 VITALS — WEIGHT: 240 LBS | BODY MASS INDEX: 35.55 KG/M2 | HEIGHT: 69 IN

## 2024-06-19 DIAGNOSIS — Z12.31 ENCOUNTER FOR SCREENING MAMMOGRAM FOR MALIGNANT NEOPLASM OF BREAST: ICD-10-CM

## 2024-06-19 PROCEDURE — 77067 SCR MAMMO BI INCL CAD: CPT | Performed by: RADIOLOGY

## 2024-06-19 PROCEDURE — 77063 BREAST TOMOSYNTHESIS BI: CPT | Performed by: RADIOLOGY

## 2024-06-19 PROCEDURE — 77067 SCR MAMMO BI INCL CAD: CPT

## 2024-07-15 DIAGNOSIS — I10 ESSENTIAL HYPERTENSION: ICD-10-CM

## 2024-07-15 RX ORDER — ACEBUTOLOL HYDROCHLORIDE 200 MG/1
200 CAPSULE ORAL DAILY
Qty: 90 CAPSULE | Refills: 3 | Status: SHIPPED | OUTPATIENT
Start: 2024-07-15

## 2024-08-05 ENCOUNTER — LAB (OUTPATIENT)
Dept: LAB | Facility: LAB | Age: 64
End: 2024-08-05
Payer: COMMERCIAL

## 2024-08-05 DIAGNOSIS — Z00.00 ROUTINE GENERAL MEDICAL EXAMINATION AT A HEALTH CARE FACILITY: ICD-10-CM

## 2024-08-05 DIAGNOSIS — R73.03 PREDIABETES: ICD-10-CM

## 2024-08-05 LAB
ALBUMIN SERPL BCP-MCNC: 4.4 G/DL (ref 3.4–5)
ALP SERPL-CCNC: 113 U/L (ref 33–136)
ALT SERPL W P-5'-P-CCNC: 26 U/L (ref 7–45)
ANION GAP SERPL CALC-SCNC: 11 MMOL/L (ref 10–20)
AST SERPL W P-5'-P-CCNC: 17 U/L (ref 9–39)
BILIRUB SERPL-MCNC: 0.7 MG/DL (ref 0–1.2)
BUN SERPL-MCNC: 17 MG/DL (ref 6–23)
CALCIUM SERPL-MCNC: 9.9 MG/DL (ref 8.6–10.6)
CHLORIDE SERPL-SCNC: 103 MMOL/L (ref 98–107)
CHOLEST SERPL-MCNC: 184 MG/DL (ref 0–199)
CHOLESTEROL/HDL RATIO: 3.9
CO2 SERPL-SCNC: 31 MMOL/L (ref 21–32)
CREAT SERPL-MCNC: 1.16 MG/DL (ref 0.5–1.05)
EGFRCR SERPLBLD CKD-EPI 2021: 53 ML/MIN/1.73M*2
ERYTHROCYTE [DISTWIDTH] IN BLOOD BY AUTOMATED COUNT: 13 % (ref 11.5–14.5)
EST. AVERAGE GLUCOSE BLD GHB EST-MCNC: 120 MG/DL
GLUCOSE SERPL-MCNC: 105 MG/DL (ref 74–99)
HBA1C MFR BLD: 5.8 %
HCT VFR BLD AUTO: 45.2 % (ref 36–46)
HDLC SERPL-MCNC: 46.7 MG/DL
HGB BLD-MCNC: 14.5 G/DL (ref 12–16)
LDLC SERPL CALC-MCNC: 103 MG/DL
MCH RBC QN AUTO: 29.4 PG (ref 26–34)
MCHC RBC AUTO-ENTMCNC: 32.1 G/DL (ref 32–36)
MCV RBC AUTO: 92 FL (ref 80–100)
NON HDL CHOLESTEROL: 137 MG/DL (ref 0–149)
NRBC BLD-RTO: 0 /100 WBCS (ref 0–0)
PLATELET # BLD AUTO: 245 X10*3/UL (ref 150–450)
POTASSIUM SERPL-SCNC: 4.5 MMOL/L (ref 3.5–5.3)
PROT SERPL-MCNC: 6.7 G/DL (ref 6.4–8.2)
RBC # BLD AUTO: 4.94 X10*6/UL (ref 4–5.2)
SODIUM SERPL-SCNC: 140 MMOL/L (ref 136–145)
TRIGL SERPL-MCNC: 174 MG/DL (ref 0–149)
TSH SERPL-ACNC: 3.08 MIU/L (ref 0.44–3.98)
VLDL: 35 MG/DL (ref 0–40)
WBC # BLD AUTO: 5.5 X10*3/UL (ref 4.4–11.3)

## 2024-08-05 PROCEDURE — 83036 HEMOGLOBIN GLYCOSYLATED A1C: CPT

## 2024-08-05 PROCEDURE — 84443 ASSAY THYROID STIM HORMONE: CPT

## 2024-08-05 PROCEDURE — 85027 COMPLETE CBC AUTOMATED: CPT

## 2024-08-05 PROCEDURE — 80053 COMPREHEN METABOLIC PANEL: CPT

## 2024-08-05 PROCEDURE — 80061 LIPID PANEL: CPT

## 2024-08-05 PROCEDURE — 36415 COLL VENOUS BLD VENIPUNCTURE: CPT

## 2024-08-08 ENCOUNTER — APPOINTMENT (OUTPATIENT)
Dept: PRIMARY CARE | Facility: CLINIC | Age: 64
End: 2024-08-08
Payer: COMMERCIAL

## 2024-08-12 ENCOUNTER — TELEPHONE (OUTPATIENT)
Dept: PRIMARY CARE | Facility: CLINIC | Age: 64
End: 2024-08-12

## 2024-08-12 ENCOUNTER — OFFICE VISIT (OUTPATIENT)
Dept: PRIMARY CARE | Facility: CLINIC | Age: 64
End: 2024-08-12
Payer: COMMERCIAL

## 2024-08-12 VITALS
SYSTOLIC BLOOD PRESSURE: 138 MMHG | OXYGEN SATURATION: 97 % | HEART RATE: 70 BPM | BODY MASS INDEX: 35.88 KG/M2 | DIASTOLIC BLOOD PRESSURE: 84 MMHG | HEIGHT: 69 IN | WEIGHT: 242.25 LBS

## 2024-08-12 DIAGNOSIS — I10 ESSENTIAL HYPERTENSION: Primary | ICD-10-CM

## 2024-08-12 DIAGNOSIS — E66.01 SEVERE OBESITY (BMI 35.0-35.9 WITH COMORBIDITY) (MULTI): ICD-10-CM

## 2024-08-12 DIAGNOSIS — F32.A DEPRESSION, UNSPECIFIED DEPRESSION TYPE: ICD-10-CM

## 2024-08-12 DIAGNOSIS — Z00.00 ROUTINE HEALTH MAINTENANCE: ICD-10-CM

## 2024-08-12 PROBLEM — Z86.79 HISTORY OF HYPERTENSION: Status: RESOLVED | Noted: 2024-02-01 | Resolved: 2024-08-12

## 2024-08-12 PROCEDURE — 93000 ELECTROCARDIOGRAM COMPLETE: CPT | Performed by: INTERNAL MEDICINE

## 2024-08-12 PROCEDURE — 3008F BODY MASS INDEX DOCD: CPT | Performed by: INTERNAL MEDICINE

## 2024-08-12 PROCEDURE — 99396 PREV VISIT EST AGE 40-64: CPT | Performed by: INTERNAL MEDICINE

## 2024-08-12 PROCEDURE — 3075F SYST BP GE 130 - 139MM HG: CPT | Performed by: INTERNAL MEDICINE

## 2024-08-12 PROCEDURE — 3079F DIAST BP 80-89 MM HG: CPT | Performed by: INTERNAL MEDICINE

## 2024-08-12 PROCEDURE — 1036F TOBACCO NON-USER: CPT | Performed by: INTERNAL MEDICINE

## 2024-08-12 ASSESSMENT — ENCOUNTER SYMPTOMS
HEMATURIA: 0
LIGHT-HEADEDNESS: 0
TREMORS: 0
CONSTIPATION: 0
VOICE CHANGE: 0
DIFFICULTY URINATING: 0
BLOOD IN STOOL: 0
TROUBLE SWALLOWING: 0
WHEEZING: 0
ARTHRALGIAS: 0
NAUSEA: 0
DIARRHEA: 0
HEADACHES: 0
PALPITATIONS: 0
DIZZINESS: 0
UNEXPECTED WEIGHT CHANGE: 0
APPETITE CHANGE: 0
FATIGUE: 0
CHEST TIGHTNESS: 0
VOMITING: 0
COUGH: 0
ACTIVITY CHANGE: 0

## 2024-08-12 NOTE — PROGRESS NOTES
Subjective   Patient ID: Romi Vicente is a 64 y.o. female who presents for Annual Exam.    64 y.o. here for a AMWV/physical  Has been feeling well  Has more left shoulder pain - trouble reaching back  Worsening for 2 months    HPI  Single  Employment -  Children - none  Tob - Nonsmoker  Alcohol - 1 per week  Marijuana  - denies  Exercise -  walks    Review of Systems   Constitutional:  Negative for activity change, appetite change, fatigue and unexpected weight change.   HENT:  Negative for ear pain, hearing loss, tinnitus, trouble swallowing and voice change.    Eyes:  Negative for visual disturbance.   Respiratory:  Negative for cough, chest tightness and wheezing.    Cardiovascular:  Negative for chest pain, palpitations and leg swelling.   Gastrointestinal:  Negative for blood in stool, constipation, diarrhea, nausea and vomiting.   Genitourinary:  Negative for difficulty urinating, hematuria and vaginal bleeding.   Musculoskeletal:  Negative for arthralgias.   Skin:  Negative for rash.   Neurological:  Negative for dizziness, tremors, syncope, light-headedness and headaches.       Objective   Vitals:    08/12/24 1543   BP: 138/84   Pulse:    SpO2:      Physical Exam  Constitutional:       General: She is not in acute distress.     Appearance: She is well-developed. She is not diaphoretic.   HENT:      Head: Normocephalic.      Right Ear: Tympanic membrane normal. There is no impacted cerumen.      Left Ear: Tympanic membrane normal. There is no impacted cerumen.      Nose: Nose normal.      Mouth/Throat:      Mouth: Mucous membranes are moist.      Pharynx: Oropharynx is clear. No oropharyngeal exudate or posterior oropharyngeal erythema.   Eyes:      General: No scleral icterus.     Extraocular Movements: Extraocular movements intact.      Conjunctiva/sclera: Conjunctivae normal.      Pupils: Pupils are equal, round, and reactive to light.   Neck:      Thyroid: No thyromegaly.      Vascular: No JVD.    Cardiovascular:      Rate and Rhythm: Normal rate and regular rhythm.      Pulses: Normal pulses.      Heart sounds: Normal heart sounds. No murmur heard.     No friction rub. No gallop.   Pulmonary:      Effort: Pulmonary effort is normal. No respiratory distress.      Breath sounds: Normal breath sounds. No wheezing or rales.   Chest:      Chest wall: No tenderness.   Abdominal:      General: Bowel sounds are normal. There is no distension.      Palpations: Abdomen is soft. There is no mass.      Tenderness: There is no abdominal tenderness. There is no rebound.   Musculoskeletal:         General: Normal range of motion.      Cervical back: Normal range of motion and neck supple.   Lymphadenopathy:      Cervical: No cervical adenopathy.   Skin:     General: Skin is warm and dry.   Neurological:      General: No focal deficit present.      Mental Status: She is alert and oriented to person, place, and time.      Deep Tendon Reflexes: Reflexes normal.   Psychiatric:         Mood and Affect: Mood normal.         Thought Content: Thought content normal.         Assessment/Plan   Problem List Items Addressed This Visit       Depression    Current Assessment & Plan     Sees psychiatry         Essential hypertension - Primary    Current Assessment & Plan     A little high today - monitor for now         Severe obesity (BMI 35.0-35.9 with comorbidity) (Multi)    Current Assessment & Plan     Discussed diet and exercise  Offered GLP1 - pt declined            Other Visit Diagnoses       Routine health maintenance        Relevant Orders    ECG 12 lead (Clinic Performed) (Completed)               Follow up with me in 3 months
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
## 2024-09-20 DIAGNOSIS — I10 ESSENTIAL HYPERTENSION: ICD-10-CM

## 2024-09-20 RX ORDER — AMLODIPINE BESYLATE 5 MG/1
TABLET ORAL
Qty: 90 TABLET | Refills: 3 | Status: SHIPPED | OUTPATIENT
Start: 2024-09-20

## 2024-11-07 ENCOUNTER — APPOINTMENT (OUTPATIENT)
Dept: PRIMARY CARE | Facility: CLINIC | Age: 64
End: 2024-11-07
Payer: COMMERCIAL

## 2024-11-13 PROBLEM — R55 VASOVAGAL SYNCOPE: Status: ACTIVE | Noted: 2024-11-13

## 2024-11-14 ENCOUNTER — APPOINTMENT (OUTPATIENT)
Dept: PRIMARY CARE | Facility: CLINIC | Age: 64
End: 2024-11-14
Payer: COMMERCIAL

## 2024-11-14 VITALS
WEIGHT: 243.2 LBS | SYSTOLIC BLOOD PRESSURE: 135 MMHG | HEART RATE: 67 BPM | BODY MASS INDEX: 35.91 KG/M2 | TEMPERATURE: 97.5 F | OXYGEN SATURATION: 94 % | DIASTOLIC BLOOD PRESSURE: 86 MMHG

## 2024-11-14 DIAGNOSIS — J45.20 MILD INTERMITTENT REACTIVE AIRWAY DISEASE WITHOUT COMPLICATION (HHS-HCC): ICD-10-CM

## 2024-11-14 DIAGNOSIS — I10 ESSENTIAL HYPERTENSION: ICD-10-CM

## 2024-11-14 DIAGNOSIS — R73.03 PREDIABETES: ICD-10-CM

## 2024-11-14 DIAGNOSIS — M75.82 TENDINITIS OF LEFT ROTATOR CUFF: Primary | ICD-10-CM

## 2024-11-14 DIAGNOSIS — E66.01 SEVERE OBESITY (BMI 35.0-35.9 WITH COMORBIDITY) (MULTI): ICD-10-CM

## 2024-11-14 DIAGNOSIS — F32.A DEPRESSION, UNSPECIFIED DEPRESSION TYPE: ICD-10-CM

## 2024-11-14 LAB — POC HEMOGLOBIN A1C: 5.9 % (ref 4.2–6.5)

## 2024-11-14 PROCEDURE — 99214 OFFICE O/P EST MOD 30 MIN: CPT | Performed by: INTERNAL MEDICINE

## 2024-11-14 PROCEDURE — 3075F SYST BP GE 130 - 139MM HG: CPT | Performed by: INTERNAL MEDICINE

## 2024-11-14 PROCEDURE — 83036 HEMOGLOBIN GLYCOSYLATED A1C: CPT | Performed by: INTERNAL MEDICINE

## 2024-11-14 PROCEDURE — 3079F DIAST BP 80-89 MM HG: CPT | Performed by: INTERNAL MEDICINE

## 2024-11-14 ASSESSMENT — ENCOUNTER SYMPTOMS
PALPITATIONS: 0
SHORTNESS OF BREATH: 0
APPETITE CHANGE: 0
COUGH: 0
ACTIVITY CHANGE: 0

## 2024-11-14 ASSESSMENT — PATIENT HEALTH QUESTIONNAIRE - PHQ9
1. LITTLE INTEREST OR PLEASURE IN DOING THINGS: NOT AT ALL
SUM OF ALL RESPONSES TO PHQ9 QUESTIONS 1 AND 2: 0
2. FEELING DOWN, DEPRESSED OR HOPELESS: NOT AT ALL

## 2024-11-14 NOTE — PROGRESS NOTES
Subjective   Patient ID: Romi Vicente is a 64 y.o. female who presents for Follow-up (Patient is here for a 6 month follow up. ) and Pain (Patient complains of left shoulder pain due to the rotator cuff. ).  HPI  Here for follow up  Feels well  No complaints    Review of Systems   Constitutional:  Negative for activity change and appetite change.   Respiratory:  Negative for cough and shortness of breath.    Cardiovascular:  Negative for chest pain, palpitations and leg swelling.       Objective   Vitals:    11/14/24 0929   BP: 135/86   Pulse: 67   Temp: 36.4 °C (97.5 °F)   SpO2: 94%     Physical Exam  Vitals and nursing note reviewed.   Cardiovascular:      Rate and Rhythm: Normal rate and regular rhythm.   Pulmonary:      Effort: Pulmonary effort is normal.      Breath sounds: Normal breath sounds.           Assessment & Plan  Tendinitis of left rotator cuff  Since she is not improving with home exercises we will send her to physical therapy  Orders:    Referral to Physical Therapy; Future    Essential hypertension  Stable       Severe obesity (BMI 35.0-35.9 with comorbidity) (Multi)  She is considering going back to weight watchers.       Depression, unspecified depression type  Sees psychiatry       Prediabetes  Stable  Orders:    POCT glycosylated hemoglobin (Hb A1C) manually resulted    Mild intermittent reactive airway disease without complication (HHS-HCC)  Stable         Follow-up with me in 4 months or as needed

## 2024-12-02 ENCOUNTER — EVALUATION (OUTPATIENT)
Dept: PHYSICAL THERAPY | Facility: CLINIC | Age: 64
End: 2024-12-02
Payer: COMMERCIAL

## 2024-12-02 DIAGNOSIS — M75.82 TENDINITIS OF LEFT ROTATOR CUFF: ICD-10-CM

## 2024-12-02 PROCEDURE — 97161 PT EVAL LOW COMPLEX 20 MIN: CPT | Mod: GP | Performed by: PHYSICAL THERAPIST

## 2024-12-02 PROCEDURE — 97110 THERAPEUTIC EXERCISES: CPT | Mod: GP | Performed by: PHYSICAL THERAPIST
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
## 2024-12-09 ENCOUNTER — TREATMENT (OUTPATIENT)
Dept: PHYSICAL THERAPY | Facility: CLINIC | Age: 64
End: 2024-12-09
Payer: COMMERCIAL

## 2024-12-09 DIAGNOSIS — M75.82 TENDINITIS OF LEFT ROTATOR CUFF: Primary | ICD-10-CM

## 2024-12-09 PROCEDURE — 97140 MANUAL THERAPY 1/> REGIONS: CPT | Mod: GP | Performed by: PHYSICAL THERAPIST

## 2024-12-09 PROCEDURE — 97110 THERAPEUTIC EXERCISES: CPT | Mod: GP | Performed by: PHYSICAL THERAPIST

## 2024-12-09 NOTE — PROGRESS NOTES
Physical Therapy Follow-up    Patient Name: Romi Vicente  MRN: [id number]  Today's Date: 12/9/2024  Time Calculation  Start Time: 1133  Stop Time: 1215  Time Calculation (min): 42 min      Visit#: 2. 30 approved.  Cert dates: NA    Precautions: None.    Subjective: 0/10 resting pain today. Reaching ~ same. HEP going well. Had mild incr pain for 2-3 days following eval.     Objective: AROM shoulder flex: wnl. Abd: ~130- pain. Hbb: ~L3.    Treatment:   Therapeutic exercise: Ball rolling up wall into flex, ball rolling- small circles on wall, Tband row- all demo'd correctly.   Tband IR- HEP 20x, 2x/day.   Tband bilat ER- HEP 20x, 2x/day.     Manual therapy: IV-- GH caud and AP in flex and abd// AROM abd: wnl- decr pain.     Assessment: Incr ROM, decr sxs with rx.    Plan: Incr scap and GH jt stab exer.

## 2024-12-16 ENCOUNTER — TREATMENT (OUTPATIENT)
Dept: PHYSICAL THERAPY | Facility: CLINIC | Age: 64
End: 2024-12-16
Payer: COMMERCIAL

## 2024-12-16 DIAGNOSIS — M75.82 TENDINITIS OF LEFT ROTATOR CUFF: Primary | ICD-10-CM

## 2024-12-16 PROCEDURE — 97140 MANUAL THERAPY 1/> REGIONS: CPT | Mod: GP | Performed by: PHYSICAL THERAPIST

## 2024-12-16 PROCEDURE — 97110 THERAPEUTIC EXERCISES: CPT | Mod: GP | Performed by: PHYSICAL THERAPIST

## 2024-12-16 NOTE — PROGRESS NOTES
Physical Therapy Follow-up    Patient Name: Romi Vicente  MRN: [id number]  Today's Date: 12/16/2024  Time Calculation  Start Time: 1132  Stop Time: 1203  Time Calculation (min): 31 min      Visit#: 3. 30 approved.  Cert dates: NA    Precautions: None.    Subjective: 0/10 resting pain today. Reaching slightly better. HEP going well.     Objective: AROM shoulder flex: wnl. Abd: ~130- pain. Hbb: ~L3.    Treatment:   Therapeutic exercise: Ball rolling up wall into flex, ball rolling- small circles on wall, Tband row, Tband IR, Tband bilat ER- all demo'd correctly.      Manual therapy: IV-- GH caud and AP in flex and abd, gentle cross fiber to LH biceps and supraspinatus// AROM flex: ~110- slight incr pain. Abd: ~130- slight decr pain. Hbb: ~L2- slight decr pain.     Assessment: Mixed results with manual PT. Demo's HEP correctly.    Plan: Incr scap and GH jt stab exer.

## 2024-12-23 ENCOUNTER — TREATMENT (OUTPATIENT)
Dept: PHYSICAL THERAPY | Facility: CLINIC | Age: 64
End: 2024-12-23
Payer: COMMERCIAL

## 2024-12-23 DIAGNOSIS — M75.82 TENDINITIS OF LEFT ROTATOR CUFF: Primary | ICD-10-CM

## 2024-12-23 PROCEDURE — 97110 THERAPEUTIC EXERCISES: CPT | Mod: GP | Performed by: PHYSICAL THERAPIST

## 2024-12-23 PROCEDURE — 97140 MANUAL THERAPY 1/> REGIONS: CPT | Mod: GP | Performed by: PHYSICAL THERAPIST

## 2024-12-23 NOTE — PROGRESS NOTES
Physical Therapy Follow-up    Patient Name: Romi Vicente  MRN: [id number]  Today's Date: 12/23/2024  Time Calculation  Start Time: 1138  Stop Time: 1205  Time Calculation (min): 27 min      Visit#: 4. 30 approved.  Cert dates: NA    Precautions: None.    Subjective: 0/10 resting pain today. Reaching ~ same. HEP going well.     Objective: AROM shoulder flex: wnl. Abd: ~140- pain. Hbb: ~L3.    Treatment:   Therapeutic exercise: Ball rolling up wall into flex, ball rolling- small circles on wall, Tband row, Tband IR, Tband bilat ER- all demo'd correctly.      Manual therapy: IV-- GH caud and AP in abd// AROM Abd: ~155- slight decr pain. Hbb: no change.     Assessment: Decr sxs, incr ROM with rx. Demo's HEP correctly.    Plan: Incr scap and GH jt stab exer.

## 2025-01-08 ENCOUNTER — APPOINTMENT (OUTPATIENT)
Dept: PHYSICAL THERAPY | Facility: CLINIC | Age: 65
End: 2025-01-08
Payer: COMMERCIAL

## 2025-01-15 ENCOUNTER — APPOINTMENT (OUTPATIENT)
Dept: PHYSICAL THERAPY | Facility: CLINIC | Age: 65
End: 2025-01-15
Payer: COMMERCIAL

## 2025-01-22 ENCOUNTER — APPOINTMENT (OUTPATIENT)
Dept: PHYSICAL THERAPY | Facility: CLINIC | Age: 65
End: 2025-01-22
Payer: COMMERCIAL

## 2025-02-17 ENCOUNTER — APPOINTMENT (OUTPATIENT)
Dept: PRIMARY CARE | Facility: CLINIC | Age: 65
End: 2025-02-17
Payer: COMMERCIAL

## 2025-02-17 VITALS
HEART RATE: 70 BPM | DIASTOLIC BLOOD PRESSURE: 79 MMHG | HEIGHT: 69 IN | WEIGHT: 241.2 LBS | SYSTOLIC BLOOD PRESSURE: 132 MMHG | OXYGEN SATURATION: 97 % | TEMPERATURE: 97.2 F | BODY MASS INDEX: 35.73 KG/M2

## 2025-02-17 DIAGNOSIS — R73.03 PREDIABETES: Primary | ICD-10-CM

## 2025-02-17 DIAGNOSIS — J45.909 MILD REACTIVE AIRWAYS DISEASE, UNSPECIFIED WHETHER PERSISTENT (HHS-HCC): ICD-10-CM

## 2025-02-17 DIAGNOSIS — E11.9 TYPE 2 DIABETES MELLITUS WITHOUT COMPLICATION, WITHOUT LONG-TERM CURRENT USE OF INSULIN (MULTI): ICD-10-CM

## 2025-02-17 LAB — POC HEMOGLOBIN A1C: 6.5 % (ref 4.2–6.5)

## 2025-02-17 PROCEDURE — RXMED WILLOW AMBULATORY MEDICATION CHARGE

## 2025-02-17 PROCEDURE — 83036 HEMOGLOBIN GLYCOSYLATED A1C: CPT | Performed by: INTERNAL MEDICINE

## 2025-02-17 PROCEDURE — 1036F TOBACCO NON-USER: CPT | Performed by: INTERNAL MEDICINE

## 2025-02-17 PROCEDURE — 3075F SYST BP GE 130 - 139MM HG: CPT | Performed by: INTERNAL MEDICINE

## 2025-02-17 PROCEDURE — 3008F BODY MASS INDEX DOCD: CPT | Performed by: INTERNAL MEDICINE

## 2025-02-17 PROCEDURE — 99214 OFFICE O/P EST MOD 30 MIN: CPT | Performed by: INTERNAL MEDICINE

## 2025-02-17 PROCEDURE — 3078F DIAST BP <80 MM HG: CPT | Performed by: INTERNAL MEDICINE

## 2025-02-17 RX ORDER — TIRZEPATIDE 2.5 MG/.5ML
2.5 INJECTION, SOLUTION SUBCUTANEOUS WEEKLY
Qty: 4 PEN | Refills: 3 | Status: SHIPPED | OUTPATIENT
Start: 2025-02-17 | End: 2025-06-09

## 2025-02-17 ASSESSMENT — ENCOUNTER SYMPTOMS
APPETITE CHANGE: 0
ACTIVITY CHANGE: 0
SHORTNESS OF BREATH: 0
PALPITATIONS: 0
COUGH: 0

## 2025-02-17 NOTE — ASSESSMENT & PLAN NOTE
Discussed treatment options  Willing to try GLP-1  Will see if we can get it approved    Orders:    tirzepatide (Mounjaro) 2.5 mg/0.5 mL pen injector; Inject 2.5 mg under the skin 1 (one) time per week.    Referral to Clinical Pharmacy; Future    
Now her A1C 6.5      
DISPLAY PLAN FREE TEXT

## 2025-02-17 NOTE — PROGRESS NOTES
Subjective   Patient ID: Romi Vicente is a 64 y.o. female who presents for Follow-up.  HPI  Here for follow up  Feels well    Not exercising  On Weight watchers    Review of Systems   Constitutional:  Negative for activity change and appetite change.   Respiratory:  Negative for cough and shortness of breath.    Cardiovascular:  Negative for chest pain, palpitations and leg swelling.       Objective   Vitals:    02/17/25 1402   BP: 132/79   Pulse: 70   Temp: 36.2 °C (97.2 °F)   SpO2: 97%     Physical Exam  Vitals and nursing note reviewed.   Cardiovascular:      Rate and Rhythm: Normal rate and regular rhythm.      Heart sounds: Normal heart sounds.   Pulmonary:      Effort: Pulmonary effort is normal.      Breath sounds: Normal breath sounds.   Musculoskeletal:      Cervical back: Normal range of motion.   Neurological:      Mental Status: She is alert.           Assessment & Plan  Prediabetes    Orders:    POCT glycosylated hemoglobin (Hb A1C) manually resulted    Mild reactive airways disease, unspecified whether persistent (HHS-HCC)         Type 2 diabetes mellitus without complication, without long-term current use of insulin (Multi)  Discussed treatment options  Willing to try GLP-1  Will see if we can get it approved    Orders:    tirzepatide (Mounjaro) 2.5 mg/0.5 mL pen injector; Inject 2.5 mg under the skin 1 (one) time per week.    Referral to Clinical Pharmacy; Future    Follow up with me in 3 months

## 2025-02-18 ENCOUNTER — TREATMENT (OUTPATIENT)
Dept: PHYSICAL THERAPY | Facility: CLINIC | Age: 65
End: 2025-02-18
Payer: COMMERCIAL

## 2025-02-18 DIAGNOSIS — M75.82 TENDINITIS OF LEFT ROTATOR CUFF: ICD-10-CM

## 2025-02-18 PROCEDURE — 97110 THERAPEUTIC EXERCISES: CPT | Mod: GP | Performed by: PHYSICAL THERAPIST

## 2025-02-18 PROCEDURE — 97140 MANUAL THERAPY 1/> REGIONS: CPT | Mod: GP | Performed by: PHYSICAL THERAPIST
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
## 2025-02-19 ENCOUNTER — PHARMACY VISIT (OUTPATIENT)
Dept: PHARMACY | Facility: CLINIC | Age: 65
End: 2025-02-19
Payer: COMMERCIAL

## 2025-02-20 ENCOUNTER — APPOINTMENT (OUTPATIENT)
Dept: SLEEP MEDICINE | Facility: CLINIC | Age: 65
End: 2025-02-20
Payer: COMMERCIAL

## 2025-02-20 VITALS
HEART RATE: 94 BPM | DIASTOLIC BLOOD PRESSURE: 71 MMHG | OXYGEN SATURATION: 95 % | TEMPERATURE: 97.2 F | RESPIRATION RATE: 16 BRPM | SYSTOLIC BLOOD PRESSURE: 116 MMHG | BODY MASS INDEX: 34.65 KG/M2 | WEIGHT: 242 LBS | HEIGHT: 70 IN

## 2025-02-20 DIAGNOSIS — E11.9 TYPE 2 DIABETES MELLITUS WITHOUT COMPLICATION, WITHOUT LONG-TERM CURRENT USE OF INSULIN (MULTI): Primary | ICD-10-CM

## 2025-02-20 DIAGNOSIS — G47.30 SLEEP APNEA, UNSPECIFIED TYPE: Primary | ICD-10-CM

## 2025-02-20 DIAGNOSIS — G47.50 PARASOMNIA, UNSPECIFIED TYPE: ICD-10-CM

## 2025-02-20 DIAGNOSIS — R20.2 NUMBNESS AND TINGLING OF BOTH LEGS: ICD-10-CM

## 2025-02-20 DIAGNOSIS — F51.04 CHRONIC INSOMNIA: ICD-10-CM

## 2025-02-20 DIAGNOSIS — E66.9 OBESITY (BMI 30-39.9): ICD-10-CM

## 2025-02-20 DIAGNOSIS — R20.0 NUMBNESS AND TINGLING OF BOTH LEGS: ICD-10-CM

## 2025-02-20 PROCEDURE — 1036F TOBACCO NON-USER: CPT | Performed by: GENERAL PRACTICE

## 2025-02-20 PROCEDURE — 3074F SYST BP LT 130 MM HG: CPT | Performed by: GENERAL PRACTICE

## 2025-02-20 PROCEDURE — 99214 OFFICE O/P EST MOD 30 MIN: CPT | Performed by: GENERAL PRACTICE

## 2025-02-20 PROCEDURE — 3008F BODY MASS INDEX DOCD: CPT | Performed by: GENERAL PRACTICE

## 2025-02-20 PROCEDURE — 3078F DIAST BP <80 MM HG: CPT | Performed by: GENERAL PRACTICE

## 2025-02-20 RX ORDER — BLOOD-GLUCOSE SENSOR
EACH MISCELLANEOUS
Qty: 14 EACH | Refills: 3 | Status: SHIPPED | OUTPATIENT
Start: 2025-02-20

## 2025-02-20 RX ORDER — BLOOD-GLUCOSE,RECEIVER,CONT
EACH MISCELLANEOUS
Qty: 1 EACH | Refills: 0 | Status: SHIPPED | OUTPATIENT
Start: 2025-02-20

## 2025-02-20 ASSESSMENT — SLEEP AND FATIGUE QUESTIONNAIRES
HOW LIKELY ARE YOU TO NOD OFF OR FALL ASLEEP WHILE SITTING AND TALKING TO SOMEONE: WOULD NEVER DOZE
HOW LIKELY ARE YOU TO NOD OFF OR FALL ASLEEP WHILE SITTING QUIETLY AFTER LUNCH WITHOUT ALCOHOL: SLIGHT CHANCE OF DOZING
HOW LIKELY ARE YOU TO NOD OFF OR FALL ASLEEP WHILE WATCHING TV: SLIGHT CHANCE OF DOZING
HOW LIKELY ARE YOU TO NOD OFF OR FALL ASLEEP WHEN YOU ARE A PASSENGER IN A CAR FOR AN HOUR WITHOUT A BREAK: SLIGHT CHANCE OF DOZING
HOW LIKELY ARE YOU TO NOD OFF OR FALL ASLEEP WHILE LYING DOWN TO REST IN THE AFTERNOON WHEN CIRCUMSTANCES PERMIT: HIGH CHANCE OF DOZING
HOW LIKELY ARE YOU TO NOD OFF OR FALL ASLEEP WHILE SITTING AND READING: SLIGHT CHANCE OF DOZING
ESS TOTAL SCORE: 7
HOW LIKELY ARE YOU TO NOD OFF OR FALL ASLEEP WHILE SITTING INACTIVE IN A PUBLIC PLACE: WOULD NEVER DOZE
HOW LIKELY ARE YOU TO NOD OFF OR FALL ASLEEP IN A CAR, WHILE STOPPED FOR A FEW MINUTES IN TRAFFIC: WOULD NEVER DOZE

## 2025-02-20 NOTE — PROGRESS NOTES
Patient: Romi Vicente    [id number]  : 1960 -- AGE 64 y.o.    Provider: David Warren DO     Location [facility]   Service Date: 2025              [facility]  Follow up Visit Note      HISTORY OF PRESENT ILLNESS     HISTORY OF PRESENT ILLNESS   Romi Vicente is a 64 y.o. female who presents to a [facility] for a sleep medicine evaluation with concerns of Sleep Apnea.     The patient  has a past medical history of Acute sinusitis, unspecified (2019), Encounter for examination of eyes and vision without abnormal findings, Encounter for gynecological examination (general) (routine) without abnormal findings (2012), Encounter for gynecological examination (general) (routine) without abnormal findings, Encounter for screening mammogram for malignant neoplasm of breast, Encounter for screening mammogram for malignant neoplasm of breast, H/O cardiovascular stress test (2023), Other abnormal and inconclusive findings on diagnostic imaging of breast (2014), Personal history of other diseases of the circulatory system, Personal history of other diseases of the circulatory system, Personal history of other diseases of the nervous system and sense organs (2021), Personal history of other diseases of the respiratory system (2021), Personal history of other mental and behavioral disorders, Personal history of other specified conditions, and Syncope and collapse..    PAST SLEEP HISTORY  F with pmhx including allergic rhinitis, anxiety/depression (controlled), central sleep apnea on ASV.      Patient evaluated for sleep apnea at request of cardiologist d/t dizziness,   She states that she has been using her pap therapy regularly with no concerns.   Was seen by Dr. Lott for insomnia and circadian rhythm disorder, she reports she was stated on Abilify, 5mg and Lexapro 20 mg daily in the summer/fall  of 2021.       She reports she feels good with her PAP therapy.    CURRENT HISTORY     2/22/24  the patient reports that her machine makes a wheezing noise when she fills her humidifier chamber; thus she stopped using her humidity which caused her to have a dry mouth.       PAP Related Problems: no leak perceived, yes dry mouth when she is not using humidifier, no skin irritation from mask and no snoring with PAP.   Perceived Benefits of PAP Therapy: refreshing sleep, decreased nocturia, decreased dry mouth or sore throat, decreased nocturnal awakenings and decreased snoring/ choking/ gasping.     2/20/25  Patient is comfortable with her mask and settings.     PAP Related Problems: no leak perceived, no dry mouth, no skin irritation from mask and no snoring with PAP.     Perceived Benefits of PAP Therapy: refreshing sleep, decreased nocturia, decreased dry mouth or sore throat, decreased nocturnal awakenings and decreased snoring/ choking/ gasping.     Sleep schedule  on weekdays / work days:  Usual Bedtime: 10pm  Sleep latency: few min- 30min  Wake time : 6 am  Total sleep time average/day: 7-8 hours/day  Awakenings: 1-2 x per night, unknown/ TV, short  Naps: 1x per month, afternoon, 30min, refreshing, with pap therapy.     Sleep schedule  on weekends/non work days :  Same as above.     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     Preferred sleeping position: SLEEP POSITION: sidelying    Sleep-related ROS:    Snoring: n   Witnessed apneas:     n   Gasping/ choking: n     Am Dry mouth:    n    Nasal congestion:   sometimes, uses flonase prn.       am headaches: n    Sleep is described as refreshing .     Daytime sleepiness: rarely  Fatigue or decreased energy: rarely  Difficulty remembering things in daytime: sometimes   Difficulty staying focused in daytime: : n  Irritable during the day: n    Drowsy driving: n  Hx of car accident: n  Near-miss Car accident:  n      RLS screen:  Tingling in whole legs and body?  Improves with laying down and moving. Around bedtime.     Sleep-related behaviors: sleep talks; Unsure if she acts out her dreams, lives alone but thinks she may be sitting up in bed. Only happened 1x in lifetime in early 2024.    Sleep environment:  Bed partner :  n  Pets in bed :   n    Daytime Symptoms    ESS: 7       REVIEW OF SYSTEMS     REVIEW OF SYSTEMS  Review of Systems   All other systems reviewed and are negative.        ALLERGIES AND MEDICATIONS     ALLERGIES  Allergies   Allergen Reactions    Bee Pollen Unknown    House Dust Unknown    Methimazole Other     Never been tested, not 100% sure if they are allergic and has had the flu shot in the past.    Mold Unknown    Other Unknown     Pollen    Pseudoephedrine Unknown    Sudafed [Pseudoephedrine Hcl] Other    Sulfa (Sulfonamide Antibiotics) Unknown    Thimerosal Other     Not sure    Naproxen Rash       MEDICATIONS  Current Outpatient Medications   Medication Sig Dispense Refill    acebutolol (Sectral) 200 mg capsule Take 1 capsule (200 mg) by mouth once daily. 90 capsule 3    amLODIPine (Norvasc) 5 mg tablet TAKE 1 TABLET ONCE DAILY FOR ESSENTIAL HYPERTENSION 90 tablet 3    ARIPiprazole (Abilify) 15 mg tablet Take 0.5 tablets (7.5 mg) by mouth once daily. For SocHx alcohol use      BIOTIN ORAL Take 10 mg by mouth. Capsule, for SocHx Alcohol use history      buPROPion XL (Wellbutrin XL) 300 mg 24 hr tablet Take 1 tablet (300 mg) by mouth once daily. Do not crush, chew, or split.      cholecalciferol (Vitamin D-3) 50 MCG (2000 UT) tablet Take 1 tablet (50 mcg) by mouth once daily. For health maintenance      CHONDROITIN SULFATE A ORAL Take 1 capsule by mouth once daily. For SocHx alcohol use history      coenzyme Q-10 200 mg capsule Take 1 capsule (200 mg) by mouth once daily. For health maintenance      dextromethorphan-guaifenesin (MUCINEX DM) 60-1,200 mg tablet extended release 12 hr Take by mouth.       escitalopram (Lexapro) 20 mg tablet Take 1 tablet (20 mg) by mouth once daily.      fexofenadine (Allegra) 180 mg tablet Take by mouth. For health maintenace      fluticasone (Flonase) 50 mcg/actuation nasal spray Administer into affected nostril(s). For health maintenace      tirzepatide (Mounjaro) 2.5 mg/0.5 mL pen injector Inject 2.5 mg under the skin 1 (one) time per week. (Patient not taking: Reported on 2/20/2025) 4 Pen 3     No current facility-administered medications for this visit.         PAST HISTORY     PAST MEDICAL HISTORY  She  has a past medical history of Acute sinusitis, unspecified (12/02/2019), Encounter for examination of eyes and vision without abnormal findings, Encounter for gynecological examination (general) (routine) without abnormal findings (11/13/2012), Encounter for gynecological examination (general) (routine) without abnormal findings, Encounter for screening mammogram for malignant neoplasm of breast, Encounter for screening mammogram for malignant neoplasm of breast, H/O cardiovascular stress test (02/17/2023), Other abnormal and inconclusive findings on diagnostic imaging of breast (12/04/2014), Personal history of other diseases of the circulatory system, Personal history of other diseases of the circulatory system, Personal history of other diseases of the nervous system and sense organs (03/08/2021), Personal history of other diseases of the respiratory system (03/08/2021), Personal history of other mental and behavioral disorders, Personal history of other specified conditions, and Syncope and collapse.    PAST SURGICAL HISTORY:  Past Surgical History:   Procedure Laterality Date    COLONOSCOPY  11/13/2012    Complete Colonoscopy    COLONOSCOPY  12/31/2012    Complete Colonoscopy    OTHER SURGICAL HISTORY  07/14/2022    Colonoscopy    OTHER SURGICAL HISTORY  11/13/2017    Cataract Extraction With Insertion Of Intraocular Lens       FAMILY HISTORY  Family History   Problem  "Relation Name Age of Onset    Atrial fibrillation Mother      Hypertension Mother      Hypothyroidism Mother      Leukemia Mother      Other (Other) Mother          Prolapsing Mitral Valve Leaflet Syndrome    Colon cancer Father Adria Vicente          SOCIAL HISTORY  She  reports that she has never smoked. She has never used smokeless tobacco. She reports current alcohol use of about 2.0 standard drinks of alcohol per week. She reports that she does not use drugs.   Caffeine consumption: 4 cups coffee throughout the day.      PHYSICAL EXAM     VITAL SIGNS: /71   Pulse 94   Temp 36.2 °C (97.2 °F) (Temporal)   Resp 16   Ht 1.765 m (5' 9.5\")   Wt 110 kg (242 lb)   SpO2 95%   BMI 35.22 kg/m²      PREVIOUS WEIGHTS:  Wt Readings from Last 3 Encounters:   02/20/25 110 kg (242 lb)   02/17/25 109 kg (241 lb 3.2 oz)   11/14/24 110 kg (243 lb 3.2 oz)       Physical Exam  Constitutional: Alert and oriented, cooperative, no obvious distress.   HENT: normocephalic.   Neurologic: AOx3.   psychiatric: appropriate mood and affect.  Integumentary: no significant rashes observed over pap mask area.         RESULTS/DATA         ASSESSMENT/PLAN     Ms. Vicente is a 64 y.o. female and  has a past medical history of Acute sinusitis, unspecified (12/02/2019), Encounter for examination of eyes and vision without abnormal findings, Encounter for gynecological examination (general) (routine) without abnormal findings (11/13/2012), Encounter for gynecological examination (general) (routine) without abnormal findings, Encounter for screening mammogram for malignant neoplasm of breast, Encounter for screening mammogram for malignant neoplasm of breast, H/O cardiovascular stress test (02/17/2023), Other abnormal and inconclusive findings on diagnostic imaging of breast (12/04/2014), Personal history of other diseases of the circulatory system, Personal history of other diseases of the circulatory system, Personal history of other " diseases of the nervous system and sense organs (03/08/2021), Personal history of other diseases of the respiratory system (03/08/2021), Personal history of other mental and behavioral disorders, Personal history of other specified conditions, and Syncope and collapse. She is following up on sleep apnea.       Problem List and Orders    F with pmhx including allergic rhinitis, anxiety/depression on clonazepam as needed( 1x per yr average) and lamotrigine in am, and Abilify (following with a provider who is managing her meds), central sleep apnea on ASV, HTN, DM2.      1- Central sleep apnea?  diagnostic study not available.   cpap titration 4/3/14-->return for ASV titration for persistent central apneas.   ASV titration 5/2/14-->min EPAP 5, max EPAP15, pressure max 25, PS 3-10.  echo 12/12/19-->EF 60-65%, normal systolic function, impaired relaxation pattern of LV diastolic filling.  -patient denies any history of stroke or diagnosis of heart failure, she denies taking any narcotics.   -patient states that she had an evaluation by neurology due to fainting? and central apneas?.      Reviewed and discussed the above sleep study results as well as download data and management options in details. All questions answered, patient verbalizes understanding.      -cont. ASVAuto, EPAP 5-15cwp, PS 3-10cwp; rAHI 0.6.   -patient will let us know if her cardiac status changes, ASV is not indicated in patients with reduced EF.   -ordered supplies    -ordered an echo for an updated EF in the past but there was a concern about cost; patient will let us know if she becomes symptomatic or her cardiac status changes. She denies swelling or shortness of breath.      -do not drive or operate heavy machinery if drowsy.  -avoid sleeping on your back.   -avoid sedating substances/ medication, alcohol, illicit drugs and tobacco.     2- Insomnia improved   was following with Dr. Lott. and improved.   patient states that she feels that her  Abilify helped with her sleep.      3- Obesity   counseled on eating a healthy diet and exercising as tolerated.   will start Mounjaro soon for her diabetes.    4- parasomnia?   Unsure if she acts out her dreams, lives alone but thinks she may be sitting up in bed. Only happened 1x in lifetime in early 2024 only.   She will keep track of episodes and possible triggers.     -counseled on safety measures in details.     Please follow safety precautions. such as Pading of the sleeping area and surrounding furniture  Lower bd/ mattress to the floor and/or use a ground-floor bedroom. try to secure doors and windows (locks, alarms, barriers)  Remove sharp and dangerous objects from bedroom, including firearms  try to identify and avoid triggers.   Keep a log of  events.   try to avoid sleep disturbances, stress, keep a regular sleep schedule, use  your pap therapy regularly.      5- tingling in legs?   Tingling in whole legs and body?  Improves with laying down and moving. Around bedtime.   Recently diagnosed with DM2, there may be a component of neuropathy. Also admits to back problems which may be contributing to these symptoms.     -RLS education provided today in clinic.   -Avoid caffeine containing beverages, chocolate, nicotine and alcohol as these can make symptoms worse.   -You can try massage, exercise, stretching or warm baths before bed time.   -she takes a Vit D.   -follow up with pcp.       f/u 1 yr or sooner as needed

## 2025-02-21 ENCOUNTER — TELEMEDICINE (OUTPATIENT)
Dept: PHARMACY | Facility: HOSPITAL | Age: 65
End: 2025-02-21
Payer: COMMERCIAL

## 2025-02-21 DIAGNOSIS — E11.9 TYPE 2 DIABETES MELLITUS WITHOUT COMPLICATION, WITHOUT LONG-TERM CURRENT USE OF INSULIN (MULTI): ICD-10-CM

## 2025-02-21 NOTE — ASSESSMENT & PLAN NOTE
Patient's goal A1c is < 7%.  Is pt at goal? Yes, with most recent A1c of 6.5% on 02/17/25  Patient was recently diagnosed as T2DM with recent A1c value of 6.5%  Relevant medical hx of moderate HONEY with an AHI 27.9 (PCP note 05/02/24), HTN, and BMI of 35.22 (02/20/2025)  Does not have blood glucose testing supplies, however was provided a voucher for [employer] Scott 3 sensors and currently waiting to see if sensors will be covered by insurance.  Patient was prescribed Mounjaro 2.5 mg once weekly by Dr. Coronado - had not started the medication, was just delivered recently.  Patient was comfortable with having clinical pharmacist provide administration instructions over the phone    Rationale for plan:   With new dx of T2DM, additional comorbidities, and BMI of 35.22, patient indicated for GLP-1 agonist therapy - prescribed Mounjaro 2.5 mg once weekly by Dr. Coronado and referred to clinical pharmacy for diabetes and GLP-1 therapy management  Reviewed administration instructions, storage requirements, common side effects and how to mitigate, and what to do for a missed dose.  Encourage patient to contact clinical pharmacy team if any issues or concerns should arise before next follow up and provided direct contact information.  Will follow up in 4 weeks to assess medication tolerability and discuss options for home blood glucose monitoring if insurance does not cover CGM and patient does not want to pay out of pocket.    Medication Changes:  CONTINUE  Mounjaro 2.5 mg injected under the skin once weekly    Patient also educated on common side effects and warnings:    Increased satiety is common  Stomach upset such as stomach cramping, nausea, constipation, etc which can be precipitated by eating fatty greasy foods and overeating  Discussed risks of GLP1ra including risk of pancreatitis, MTC and worsening of DR  Also discussed risks of gastroparesis and gastroparalysis as this is a common discussion point in the news -  patient was informed that this is rare and they have no risk factors increasing their risk for developing these issues

## 2025-02-21 NOTE — PROGRESS NOTES
Clinical Pharmacy Appointment    Patient ID: Romi Vicente is a 64 y.o. female who presents for diabetes management.     Pt is here for First appointment.     Referring Provider: Gwen Coronado MD  PCP: Gwen Coronado MD   Last visit with PCP: 02/17/25   Next visit with PCP: 06/05/25    Subjective     Interval History  Pt last seen by PCP 02/17/25 - POC A1c 6.5% on 02/17/25, pt was started on Mounjaro 2.5 mg once weekly. ePA sent and approved. Patient has relevant medical hx of moderate HONEY with an AHI 27.9 (PCP note 05/02/24), HTN, and BMI of 35.22 (02/20/2025).     HPI  DIABETES MELLITUS TYPE 2:    Diagnosed with diabetes:  02/2025  Known diabetic complications: N/A  Does patient follow with Endocrinology: N/A  Last optometry exam: ~ a year, due for one  Last foot exam: Does have a podiatrist -- patient denies sores or cuts on feet today      Current diabetic medications include:  Mounjaro 2.5 mg once weekly (have not started)  Will start today and assist with 1st dose administration  Adherence: N/A  Adverse effects: N/A    Past diabetic medications include: N/A    Patient reported weights:  02/21/25: 241 lbs    Glucose Readings:  Glucometer/CGM Type: Has a voucher for Scott 3 sensor - waiting to hear back from insurance if covered  Previous home BG readings: N/A    Any episodes of hypoglycemia? N/A  Did patient treat episode of hypoglycemia appropriately? N/A  Does the patient have a prescription for ready-to-use Glucagon? N/a    Does pt have proteinuria? N/A    Lifestyle:  Diet: 3 meals/day - weight watchers, food plan  Breakfast: Non-fat greek yogurt with berries, or granola bar (100 pam) or banana  Lunch: Turkey sandwich on whole wheat / egg wrap or a salad  Dinner: Salad with chicken  Snacks: will snack throughout the day and at night  - mozzarella pearls, skinny Pop (individual size), fruit  Drinks: Water, coffee (skim milk), tea    Physical Activity: usually will walk when weather is  good    Primary/Secondary Prevention:  Statin? No  ACE-I/ARB? No  Aspirin? No    Pertinent PMH Review:  PMH of Pancreatitis: No  PMH of Retinopathy: No  PMH of Urinary Tract Infections: Yes - but nothing recent and rare  PMH of MTC/MEN2: No  PMH of gastroparesis/severe GI diseases: No    Immunizations:  Influenza? No  COVID? Yes  Pneumonia? Yes  Shingles? Yes  Hepatitis B? No    Medication Reconciliation:  No changes made at this encounter    Drug Interactions  No relevant drug interactions were noted.  Will note that patient on Abilify for depression and anxiety - currently no concerns and controlled  Per Martine - can be associated with weight gain and increase serum glucose    Medication System Management  Patient's preferred pharmacy:   [facility] - [address] [address]  Phone: [phone] Fax: [phone]    [id number] - [place], [place] - [address] [facility]  [address] [facility] [address]  Phone: [phone] Fax: [phone]    [facility]  [address], [address]  [place] [address]  Phone: [phone] Fax: [phone]      Objective   Allergies   Allergen Reactions    Bee Pollen Unknown    House Dust Unknown    Methimazole Other     Never been tested, not 100% sure if they are allergic and has had the flu shot in the past.    Mold Unknown    Other Unknown     Pollen    Pseudoephedrine Unknown    Sudafed [Pseudoephedrine Hcl] Other    Sulfa (Sulfonamide Antibiotics) Unknown    Thimerosal Other     Not sure    Naproxen Rash     Social History     Social History Narrative    Not on file      Medication Review  Current Outpatient Medications   Medication Instructions    acebutolol (SECTRAL) 200 mg, oral, Daily    amLODIPine (Norvasc) 5 mg tablet TAKE 1 TABLET ONCE DAILY FOR ESSENTIAL HYPERTENSION    ARIPiprazole (ABILIFY) 7.5 mg, Daily    BIOTIN ORAL 10 mg    buPROPion XL (WELLBUTRIN XL) 300 mg, Daily     "cholecalciferol (VITAMIN D-3) 50 mcg, Daily    CHONDROITIN SULFATE A ORAL 1 capsule, Daily    coenzyme Q-10 200 mg capsule 1 capsule, Daily    dextromethorphan-guaifenesin (MUCINEX DM) 60-1,200 mg tablet extended release 12 hr Take by mouth.    escitalopram (Lexapro) 20 mg tablet 1 tablet, Daily    fexofenadine (Allegra) 180 mg tablet Take by mouth. For health maintenace    fluticasone (Flonase) 50 mcg/actuation nasal spray Administer into affected nostril(s). For health maintenace    FreeStyle Scott 3 [place] misc Use as instructed    FreeStyle Scott 3 Sensor device As directed    Mounjaro 2.5 mg, subcutaneous, Weekly      Vitals  BP Readings from Last 2 Encounters:   02/20/25 116/71   02/17/25 132/79     BMI Readings from Last 1 Encounters:   02/20/25 35.22 kg/m²      Labs  A1C  Lab Results   Component Value Date    HGBA1C 6.5 02/17/2025    HGBA1C 5.9 11/14/2024    HGBA1C 5.8 (H) 08/05/2024     BMP  Lab Results   Component Value Date    CALCIUM 9.9 08/05/2024     08/05/2024    K 4.5 08/05/2024    CO2 31 08/05/2024     08/05/2024    BUN 17 08/05/2024    CREATININE 1.16 (H) 08/05/2024    EGFR 53 (L) 08/05/2024     LFTs  Lab Results   Component Value Date    ALT 26 08/05/2024    AST 17 08/05/2024    ALKPHOS 113 08/05/2024    BILITOT 0.7 08/05/2024     FLP  Lab Results   Component Value Date    TRIG 174 (H) 08/05/2024    CHOL 184 08/05/2024    LDLF 84 08/02/2023    LDLCALC 103 (H) 08/05/2024    HDL 46.7 08/05/2024     Urine Microalbumin  No results found for: \"MICROALBCREA\"  Weight Management  Wt Readings from Last 3 Encounters:   02/20/25 110 kg (242 lb)   02/17/25 109 kg (241 lb 3.2 oz)   11/14/24 110 kg (243 lb 3.2 oz)     Completed education for the administration of once-weekly Mounjaro:  1. Instructed patient that Mounjaro must be kept refrigerated, and if necessary a pen may be stored unrefrigerated at temperatures not to exceed 30C (86F) for up to 21 days.   2. Remove the Pen from the refrigerator. " Leave the base cap on until you are ready to inject.  3. Check the Pen label to make sure you have the right medicine and it has not . Additionally, ensure that the solution is not cloudy, discolored, or has particles in it.  4. Prior to administration, wash and rinse hands.   5. Next, choose your injection site (You may inject into your abdomen or thigh; another person may give you the injection in your upper arm).  6. Change (rotate) your injection site each week. You may use the same area of your body, but be sure to choose a different injection site in that area.  7. Once administration site has been selected, use a new alcohol swab to sanitize the administration site and allow to air dry.  8. First, make sure the pen is in the locked position. While still in the locked position remove the base cap (gray cap) and dispose into a regular trash. Do not attempt to put the base cap back on, this may damage the needle.  9. Place the Clear Base flat and firmly against your skin at the injection site.   10. Press and hold the purple injection button. You will hear a loud click. Continue holding the Clear Base firmly against your skin until you hear a second click. Do not rub the area after administration.  11. Dispose of the pen in a sharps container (i.e. Coffee can, laundry detergent bottle)  12. Injections should be done on the same day every week, if you forget you have up to 4 days (96 hours) to remember to do your next dose.      Eating tips to reduce nausea and unwanted GI side effects from Ozempic and Mounjaro   Eat slowly  Eat smaller portions  Avoid laying down right after meal  Stop eating when feeling full  Avoid drinking from straw  Stay hydrated, drink small amounts of water throughout the day  Avoid strenuous exercise after eating  Avoid eating close too bedtime     Meal planning tips  Avoid fried foods, and high fat meals  Focus on bland foods  Choose water rich foods like soups, kefir, protein  drinks/smoothies      Lifestyle suggestions  Keep a food/symptom diary, as it may be helpful to identify meal timing of foods that make nausea worse.  Engage in light exercise (walking), get fresh air     Additional tips for alleviating nausea:  Wait at least 30 minutes after GLP1-RA dose to eat, if feeling nauseated try crackers, apples, mint, kait root or kait tea  Avoid strong smells     If vomiting:  Eat smaller amounts of food in more frequent meals  Stay hydrated, but avoid drinks during meals, wait 30-60 minutes before and after meals to have liquids     If having diarrhea:  Generous hydration, i.e: water with lemon and tsp baking soda  Avoid dairy products, laxatives, coffee, alcoholic beverages, soft drinks, very cold or very hot foods, products that contain sugar alcohols (sorbitol, mannitol, xylitol, maltitol), including gum and candy  Avoid (or temporarily reduce your intake of) foods with high fiber content such as grain and seed products, whole grain breads, artichokes, asparagus, beans, cabbage, lentils, mushrooms, onions, garlic, sugar snap peas, skinned fruits, apples, apricots, blackberries, cherries, guertia, nectarine, pears, plum  Eat chicken broth, rice, carrots, very ripe fruit without skin  Gradually increase fiber back in when symptoms improve     If having constipation:  Ensure the amount of fiber in diet is adequate (goal is minimum of 25 grams per day).  Increase physical activity  Drink generous amounts of water     Assessment/Plan   Problem List Items Addressed This Visit       Type 2 diabetes mellitus without complication, without long-term current use of insulin (Multi)     Patient's goal A1c is < 7%.  Is pt at goal? Yes, with most recent A1c of 6.5% on 02/17/25  Patient was recently diagnosed as T2DM with recent A1c value of 6.5%  Relevant medical hx of moderate HONEY with an AHI 27.9 (PCP note 05/02/24), HTN, and BMI of 35.22 (02/20/2025)  Does not have blood glucose testing  supplies, however was provided a voucher for FreeStyle Scott 3 sensors and currently waiting to see if sensors will be covered by insurance.  Patient was prescribed Mounjaro 2.5 mg once weekly by Dr. Coronado - had not started the medication, was just delivered recently.  Patient was comfortable with having clinical pharmacist provide administration instructions over the phone    Rationale for plan:   With new dx of T2DM, additional comorbidities, and BMI of 35.22, patient indicated for GLP-1 agonist therapy - prescribed Mounjaro 2.5 mg once weekly by Dr. Coronado and referred to clinical pharmacy for diabetes and GLP-1 therapy management  Reviewed administration instructions, storage requirements, common side effects and how to mitigate, and what to do for a missed dose.  Encourage patient to contact clinical pharmacy team if any issues or concerns should arise before next follow up and provided direct contact information.  Will follow up in 4 weeks to assess medication tolerability and discuss options for home blood glucose monitoring if insurance does not cover CGM and patient does not want to pay out of pocket.    Medication Changes:  CONTINUE  Mounjaro 2.5 mg injected under the skin once weekly    Patient also educated on common side effects and warnings:    Increased satiety is common  Stomach upset such as stomach cramping, nausea, constipation, etc which can be precipitated by eating fatty greasy foods and overeating  Discussed risks of GLP1ra including risk of pancreatitis, MTC and worsening of DR  Also discussed risks of gastroparesis and gastroparalysis as this is a common discussion point in the news - patient was informed that this is rare and they have no risk factors increasing their risk for developing these issues           Relevant Orders    Referral to Clinical Pharmacy     Clinical Pharmacist follow-up: 03/14/25, Telehealth visit    Continue all meds under the continuation of care with the  referring provider and clinical pharmacy team.    Thank you,  Vanessa Nichole (Suji), PharmD  PGY-1  Meds Pharmacy Resident     Verbal consent to manage patient's drug therapy was obtained from the patient. They were informed they may decline to participate or withdraw from participation in pharmacy services at any time.

## 2025-02-25 ENCOUNTER — TREATMENT (OUTPATIENT)
Dept: PHYSICAL THERAPY | Facility: CLINIC | Age: 65
End: 2025-02-25
Payer: COMMERCIAL

## 2025-02-25 DIAGNOSIS — M75.82 TENDINITIS OF LEFT ROTATOR CUFF: ICD-10-CM

## 2025-02-25 PROCEDURE — 97110 THERAPEUTIC EXERCISES: CPT | Mod: GP | Performed by: PHYSICAL THERAPIST

## 2025-02-25 NOTE — PROGRESS NOTES
Physical Therapy Follow-up    Patient Name: Romi Vicente  MRN: [id number]  Today's Date: 2/25/2025  Time Calculation  Start Time: 0850  Stop Time: 0916  Time Calculation (min): 26 min      Visit#: 5. 30 approved.  Cert dates: NA    Precautions: None.    Subjective: Pain ~ same. All function ~ same. HEP going well.    Objective: AROM shoulder flex: wnl- min pain. Abd: wnl- pain ~100 to end of range.     Treatment:   Therapeutic exercise:   Sleeper stretch 2x30 sec// Abd: wnl- decr pain- HEP 2x/day.  Ball rolling up wall into flex, ball rolling- small circles on wall, Tband row, Tband IR, Tband bilat ER- all demo'd correctly.   Tband hand walks up wall- HEP 5x/2x/day.     Assessment:  Demo's HEP correctly. Decr sxs with sleeper stretch.    Plan: Incr scap and LINDSEY jt stab exer.

## 2025-03-03 ENCOUNTER — TREATMENT (OUTPATIENT)
Dept: PHYSICAL THERAPY | Facility: CLINIC | Age: 65
End: 2025-03-03
Payer: COMMERCIAL

## 2025-03-03 DIAGNOSIS — M75.82 TENDINITIS OF LEFT ROTATOR CUFF: ICD-10-CM

## 2025-03-03 PROCEDURE — 97140 MANUAL THERAPY 1/> REGIONS: CPT | Mod: GP | Performed by: PHYSICAL THERAPIST

## 2025-03-03 PROCEDURE — 97110 THERAPEUTIC EXERCISES: CPT | Mod: GP | Performed by: PHYSICAL THERAPIST

## 2025-03-03 NOTE — PROGRESS NOTES
"Physical Therapy Follow-up    Patient Name: Romi Vicente  MRN: [id number]  Today's Date: 3/6/2025  Time Calculation  Start Time: 0833  Stop Time: 0912  Time Calculation (min): 39 min      Visit#: 6. 30 approved.  Cert dates: NA    Precautions: None.    Subjective: Decr pain. Easier to reach. HEP going well.    Objective: AROM shoulder flex: wnl- min pain. Abd: wnl- min pain ~140 to end of range. Hbb: ~T10- \"tight\".    Treatment:   Therapeutic exercise:   Sleeper stretch 3x30 sec// Abd: wnl- decr pain.  Ball rolling up wall into flex, ball rolling- small circles on wall, Tband row, Tband IR, Tband bilat ER, Tband hand walks up wall- all demo'd correctly.     Manual therapy: IV-- GH caud and AP in abd, IR(90)// AROM Abd: wnl- slight decr pain. Hbb: ~T9.      Assessment:  Demo's HEP correctly. Decr sxs with rx.    Plan: Incr scap and GH jt stab exer.      "

## 2025-03-13 NOTE — PROGRESS NOTES
Clinical Pharmacy Appointment    Patient ID: Romi Vicente is a 64 y.o. female who presents for diabetes management.     Pt is here for Follow-Up appointment.     Referring Provider: Gwen Coronado MD  PCP: Gwen Coronado MD   Last visit with PCP: 02/17/25   Next visit with PCP: 06/05/25    Subjective     Interval History  Pt last seen by PCP 02/17/25 - POC A1c 6.5% on 02/17/25, pt was started on Mounjaro 2.5 mg once weekly. ePA sent and approved. Patient has relevant medical hx of moderate HONEY with an AHI 27.9 (PCP note 05/02/24), HTN, and BMI of 35.22 (02/20/2025).       HPI  DIABETES MELLITUS TYPE 2:    Diagnosed with diabetes:  02/2025  Known diabetic complications: N/A  Does patient follow with Endocrinology: N/A  Last optometry exam: ~ a year, due for one  Last foot exam: Does have a podiatrist -- patient denies sores or cuts on feet today      Current diabetic medications include:  Mounjaro 2.5 mg once weekly (Fridays)  Last date taken: 03/14/25  Remaining doses: 0 - will take today  Adherence: No issues  Adverse effects: constipation, but tolerable and can self manage  Helped with food craving, not as hungry    Past diabetic medications include: N/A    Patient reported weights:  02/21/25: 241 lbs  03/14/25: 237.4 lbs    Glucose Readings:  Glucometer/CGM Type: Insurance had denied coverage of CGM - patient agreeable to use glucometer, will send scripts for testing supplies  Previous home BG readings: N/A    Any episodes of hypoglycemia? Denies  Did patient treat episode of hypoglycemia appropriately? N/A  Does the patient have a prescription for ready-to-use Glucagon? N/a    Does pt have proteinuria? N/A    Lifestyle: - No new changes at this time  Diet: 3 meals/day - weight watchers, food plan  Breakfast: Non-fat greek yogurt with berries, or granola bar (100 pam) or banana  Lunch: Turkey sandwich on whole wheat / egg wrap or a salad  Dinner: Salad with chicken  Snacks: will snack throughout the day  and at night  - mozzarella pearls, skinny Pop (individual size), fruit  Drinks: Water, coffee (skim milk), tea    Physical Activity: usually will walk when weather is good    Primary/Secondary Prevention:  Statin? No  ACE-I/ARB? No  Aspirin? No    Pertinent PMH Review:  PMH of Pancreatitis: No  PMH of Retinopathy: No  PMH of Urinary Tract Infections: Yes - but nothing recent and rare  PMH of MTC/MEN2: No  PMH of gastroparesis/severe GI diseases: No    Immunizations:  Influenza? No  COVID? Yes  Pneumonia? Yes  Shingles? Yes  Hepatitis B? No    Medication Reconciliation:  No changes made at this encounter    Drug Interactions  No relevant drug interactions were noted.  Will note that patient on Abilify for depression and anxiety - currently no concerns and controlled  Per Martine - can be associated with weight gain and increase serum glucose    Medication System Management  Patient's preferred pharmacy:   [facility] - [address] [address]  Phone: [phone] Fax: [phone]    [id number] - [place], [place] - [address] [place] [address]  Phone: [phone] Fax: [phone]    [facility]  [address] [place] [address], [address]  [place] [address]  Phone: [phone] Fax: [phone]      Objective   Allergies   Allergen Reactions    Bee Pollen Unknown    House Dust Unknown    Methimazole Other     Never been tested, not 100% sure if they are allergic and has had the flu shot in the past.    Mold Unknown    Other Unknown     Pollen    Pseudoephedrine Unknown    Sudafed [Pseudoephedrine Hcl] Other    Sulfa (Sulfonamide Antibiotics) Unknown    Thimerosal Other     Not sure    Naproxen Rash     Social History     Social History Narrative    Not on file      Medication Review  Current Outpatient Medications   Medication Instructions    acebutolol (SECTRAL) 200 mg, oral, Daily    amLODIPine (Norvasc) 5 mg  tablet TAKE 1 TABLET ONCE DAILY FOR ESSENTIAL HYPERTENSION    ARIPiprazole (ABILIFY) 7.5 mg, Daily    BIOTIN ORAL 10 mg    blood sugar diagnostic (Blood Glucose Test) strip Test blood sugar at least once a day - fasting or 2 hours after a meal    blood-glucose meter misc Test blood sugar at least once a day - fasting or 2 hours after a meal    buPROPion XL (WELLBUTRIN XL) 300 mg, Daily    cholecalciferol (VITAMIN D-3) 50 mcg, Daily    CHONDROITIN SULFATE A ORAL 1 capsule, Daily    coenzyme Q-10 200 mg capsule 1 capsule, Daily    dextromethorphan-guaifenesin (MUCINEX DM) 60-1,200 mg tablet extended release 12 hr Take by mouth.    escitalopram (Lexapro) 20 mg tablet 1 tablet, Daily    fexofenadine (Allegra) 180 mg tablet Take by mouth. For health maintenace    fluticasone (Flonase) 50 mcg/actuation nasal spray Administer into affected nostril(s). For health maintenace    FreeStyle Scott 3 [place] misc Use as instructed    FreeStyle Scott 3 Sensor device As directed    lancets misc Test blood sugar at least once a day - fasting or 2 hours after a meal    Mounjaro 5 mg, subcutaneous, Every 7 days      Vitals  BP Readings from Last 2 Encounters:   02/20/25 116/71   02/17/25 132/79     BMI Readings from Last 1 Encounters:   02/20/25 35.22 kg/m²      Labs  A1C  Lab Results   Component Value Date    HGBA1C 6.5 02/17/2025    HGBA1C 5.9 11/14/2024    HGBA1C 5.8 (H) 08/05/2024     BMP  Lab Results   Component Value Date    CALCIUM 9.9 08/05/2024     08/05/2024    K 4.5 08/05/2024    CO2 31 08/05/2024     08/05/2024    BUN 17 08/05/2024    CREATININE 1.16 (H) 08/05/2024    EGFR 53 (L) 08/05/2024     LFTs  Lab Results   Component Value Date    ALT 26 08/05/2024    AST 17 08/05/2024    ALKPHOS 113 08/05/2024    BILITOT 0.7 08/05/2024     FLP  Lab Results   Component Value Date    TRIG 174 (H) 08/05/2024    CHOL 184 08/05/2024    LDLF 84 08/02/2023    LDLCALC 103 (H) 08/05/2024    HDL 46.7 08/05/2024       Weight  Management  Wt Readings from Last 3 Encounters:   02/20/25 110 kg (242 lb)   02/17/25 109 kg (241 lb 3.2 oz)   11/14/24 110 kg (243 lb 3.2 oz)       Assessment/Plan   Problem List Items Addressed This Visit       Type 2 diabetes mellitus without complication, without long-term current use of insulin (Multi)     Patient's goal A1c is < 7%.  Patient is at goal with most recent A1c of 6.5% on 02/17/25.   Patient does not have testing supplies to test blood glucose at home - patient's pharmaceutical benefit does not cover CGMs  Denied s/sx of low blood sugar or high blood sugar.  Patient reported tolerating Mounjaro 2.5 mg well - did report some constipation, but still manageable. Denied any other side effects. Endorsed reduced food craving and appetite suppression.    Rationale for plan:   Due to patient tolerating Mounjaro 2.5 mg with minimal side effects, will plan to INCREASE to 5 mg once weekly as the initial 2.5 mg dose is not intended to be a therapeutic dose for glycemic control.   Patient agreeable to use glucometer to monitor blood glucose at home - will send prescriptions for testing supplies  Reviewed blood glucose goals, counseled when to check blood glucose levels.  Reviewed potential side effects with dose increase and how to mitigate them.  Encouraged patient to contact clinical pharmacist if issues or concerns should arise prior to next follow up.   Will follow up in 4 weeks to assess tolerance to dose increase and assess glycemic control.     Medication Changes:  INCREASE  Mounjaro 5 mg injected under the skin once weekly (increase from 2.5 mg)           Relevant Medications    tirzepatide (Mounjaro) 5 mg/0.5 mL pen injector    blood-glucose meter misc    blood sugar diagnostic (Blood Glucose Test) strip    lancets misc    Other Relevant Orders    Referral to Clinical Pharmacy     Clinical Pharmacist follow-up: 03/14/25, Telehealth visit    Continue all meds under the continuation of care with the  referring provider and clinical pharmacy team.    Thank you,  Vanessa Nichole (Suji), PharmD  PGY-1  Meds Pharmacy Resident     Verbal consent to manage patient's drug therapy was obtained from the patient. They were informed they may decline to participate or withdraw from participation in pharmacy services at any time.

## 2025-03-14 ENCOUNTER — APPOINTMENT (OUTPATIENT)
Dept: PHARMACY | Facility: HOSPITAL | Age: 65
End: 2025-03-14
Payer: COMMERCIAL

## 2025-03-14 DIAGNOSIS — E11.9 TYPE 2 DIABETES MELLITUS WITHOUT COMPLICATION, WITHOUT LONG-TERM CURRENT USE OF INSULIN (MULTI): ICD-10-CM

## 2025-03-14 PROCEDURE — RXMED WILLOW AMBULATORY MEDICATION CHARGE

## 2025-03-14 RX ORDER — TIRZEPATIDE 5 MG/.5ML
5 INJECTION, SOLUTION SUBCUTANEOUS
Qty: 2 ML | Refills: 0 | Status: SHIPPED | OUTPATIENT
Start: 2025-03-14

## 2025-03-14 RX ORDER — LANCETS
EACH MISCELLANEOUS
Qty: 100 EACH | Refills: 3 | Status: SHIPPED | OUTPATIENT
Start: 2025-03-14

## 2025-03-14 RX ORDER — IBUPROFEN 200 MG
CAPSULE ORAL
Qty: 100 EACH | Refills: 3 | Status: SHIPPED | OUTPATIENT
Start: 2025-03-14

## 2025-03-14 RX ORDER — DEXTROSE 4 G
TABLET,CHEWABLE ORAL
Qty: 1 EACH | Refills: 0 | Status: SHIPPED | OUTPATIENT
Start: 2025-03-14

## 2025-03-14 NOTE — ASSESSMENT & PLAN NOTE
Patient's goal A1c is < 7%.  Patient is at goal with most recent A1c of 6.5% on 02/17/25.   Patient does not have testing supplies to test blood glucose at home - patient's pharmaceutical benefit does not cover CGMs  Denied s/sx of low blood sugar or high blood sugar.  Patient reported tolerating Mounjaro 2.5 mg well - did report some constipation, but still manageable. Denied any other side effects. Endorsed reduced food craving and appetite suppression.    Rationale for plan:   Due to patient tolerating Mounjaro 2.5 mg with minimal side effects, will plan to INCREASE to 5 mg once weekly as the initial 2.5 mg dose is not intended to be a therapeutic dose for glycemic control.   Patient agreeable to use glucometer to monitor blood glucose at home - will send prescriptions for testing supplies  Reviewed blood glucose goals, counseled when to check blood glucose levels.  Reviewed potential side effects with dose increase and how to mitigate them.  Encouraged patient to contact clinical pharmacist if issues or concerns should arise prior to next follow up.   Will follow up in 4 weeks to assess tolerance to dose increase and assess glycemic control.     Medication Changes:  INCREASE  Mounjaro 5 mg injected under the skin once weekly (increase from 2.5 mg)

## 2025-03-19 ENCOUNTER — PHARMACY VISIT (OUTPATIENT)
Dept: PHARMACY | Facility: CLINIC | Age: 65
End: 2025-03-19
Payer: COMMERCIAL

## 2025-03-31 ENCOUNTER — TREATMENT (OUTPATIENT)
Dept: PHYSICAL THERAPY | Facility: CLINIC | Age: 65
End: 2025-03-31
Payer: COMMERCIAL

## 2025-03-31 DIAGNOSIS — M75.82 TENDINITIS OF LEFT ROTATOR CUFF: ICD-10-CM

## 2025-03-31 PROCEDURE — 97110 THERAPEUTIC EXERCISES: CPT | Mod: GP | Performed by: PHYSICAL THERAPIST

## 2025-03-31 PROCEDURE — 97140 MANUAL THERAPY 1/> REGIONS: CPT | Mod: GP | Performed by: PHYSICAL THERAPIST

## 2025-03-31 NOTE — PROGRESS NOTES
Physical Therapy Follow-up    Patient Name: Romi Vicente  MRN: [id number]  Today's Date: 3/31/2025  Time Calculation  Start Time: 0835  Stop Time: 0902  Time Calculation (min): 27 min      Visit#: 7. 30 approved.  Cert dates: NA    Precautions: None.    Subjective: Decr pain. Easier to reach. HEP going well.    Objective: AROM shoulder flex: wnl- min pain. Abd: wnl- min pain ~150 to end of range.     Treatment:   Therapeutic exercise:   Ball rolling up wall into flex, ball rolling- small circles on wall, Tband row, Tband IR, Tband bilat ER, Tband hand walks up wall, sleeper stretch- all demo'd correctly.     Manual therapy: IV-- GH caud and AP in abd, IR(90)// AROM Abd: wnl- decr pain.      Assessment:  Demo's HEP correctly. Decr sxs with rx.    Plan: Incr scap and GH jt stab exer.

## 2025-04-03 NOTE — PROGRESS NOTES
Clinical Pharmacy Appointment    Patient ID: Romi Vicente is a 65 y.o. female who presents for diabetes management.     Pt is here for Follow-Up appointment.     Referring Provider: Gwen Coronado MD  PCP: Gwen Coronado MD   Last visit with PCP: 25   Next visit with PCP: 25    Subjective     Interval History  Pt last seen by PCP 25 - POC A1c 6.5% on 25, pt was started on Mounjaro 2.5 mg once weekly. ePA sent and approved. Patient has relevant medical hx of moderate HONEY with an AHI 27.9 (PCP note 24), HTN, and BMI of 35.22 (2025).     At last clinical pharmacy encounter (25) - patient was tolerating Mounjaro 2.5 mg with minimal side effects, increased to 5 mg once weekly as the initial 2.5 mg dose is not intended to be a therapeutic dose for glycemic control.       HPI  DIABETES MELLITUS TYPE 2:    Diagnosed with diabetes:  2025  Known diabetic complications: N/A  Does patient follow with Endocrinology: N/A  Last optometry exam: ~ a year, due for one  Last foot exam: Does have a podiatrist -- patient denies sores or cuts on feet today      Current diabetic medications include:  Mounjaro 5 mg once weekly ()  Last date taken: 25  Remaining doses: 1  Adherence: No issues  Adverse effects: constipation, but tolerable and can self manage  Less food noise, decreased appetite    Past diabetic medications include: N/A    Patient reported weights:  25: 241 lbs  25: 237.4 lbs  25: 237.8    Glucose Readings:  Glucometer/CGM Type: Insurance had denied coverage of CGM - patient agreeable to use glucometer    Fasting Bs-90s  After meals 110s -130s (130s if eating a lot of carbs)    Previous home BG readings: N/A    Any episodes of hypoglycemia? Denies  Did patient treat episode of hypoglycemia appropriately? N/A  Does the patient have a prescription for ready-to-use Glucagon? N/a    Does pt have proteinuria? N/A    Lifestyle: - Has been  trying to follow a diet plan - interested in nutritionist, referral ordered today  Diet: 3 meals/day - weight watchers, food plan  Breakfast: Non-fat greek yogurt with berries, or granola bar (100 pam) or banana  Lunch: Turkey sandwich on whole wheat / egg wrap or a salad  Dinner: Salad with chicken  Snacks: will snack throughout the day and at night  - mozzarella pearls, skinny Pop (individual size), fruit  Drinks: Water, coffee (skim milk), tea    Physical Activity: usually will walk when weather is good, had some back issues    Primary/Secondary Prevention:  Statin? No  ACE-I/ARB? No  Aspirin? No    Pertinent PMH Review:  PMH of Pancreatitis: No  PMH of Retinopathy: No  PMH of Urinary Tract Infections: Yes - but nothing recent and rare  PMH of MTC/MEN2: No  PMH of gastroparesis/severe GI diseases: No    Immunizations:  Influenza? No  COVID? Yes  Pneumonia? Yes  Shingles? Yes  Hepatitis B? No    Medication Reconciliation:  No changes made at this encounter    Drug Interactions  No relevant drug interactions were noted.  Will note that patient on Abilify for depression and anxiety - currently no concerns and controlled  Per Martine - can be associated with weight gain and increase serum glucose    Medication System Management  Patient's preferred pharmacy:   [facility] - [address] [address]  Phone: [phone] Fax: [phone]    [facility]/pharmacy #[id number] [place], [place] - [address] [address]  Phone: [phone] Fax: [phone]    [facility]  [address] [place] [address], [address]  [facility] [address]  Phone: [phone] Fax: [phone]      Objective   Allergies   Allergen Reactions    Bee Pollen Unknown    House Dust Unknown    Methimazole Other     Never been tested, not 100% sure if they are allergic and has had the flu shot in the past.    Mold Unknown    Other Unknown     Pollen     Pseudoephedrine Unknown    Sudafed [Pseudoephedrine Hcl] Other    Sulfa (Sulfonamide Antibiotics) Unknown    Thimerosal Other     Not sure    Naproxen Rash     Social History     Social History Narrative    Not on file      Medication Review  Current Outpatient Medications   Medication Instructions    acebutolol (SECTRAL) 200 mg, oral, Daily    amLODIPine (Norvasc) 5 mg tablet TAKE 1 TABLET ONCE DAILY FOR ESSENTIAL HYPERTENSION    ARIPiprazole (ABILIFY) 7.5 mg, Daily    BIOTIN ORAL 10 mg    blood sugar diagnostic (Blood Glucose Test) strip Test blood sugar at least once a day - fasting or 2 hours after a meal    blood-glucose meter misc Test blood sugar at least once a day - fasting or 2 hours after a meal    buPROPion XL (WELLBUTRIN XL) 300 mg, Daily    cholecalciferol (VITAMIN D-3) 50 mcg, Daily    CHONDROITIN SULFATE A ORAL 1 capsule, Daily    coenzyme Q-10 200 mg capsule 1 capsule, Daily    dextromethorphan-guaifenesin (MUCINEX DM) 60-1,200 mg tablet extended release 12 hr Take by mouth.    escitalopram (Lexapro) 20 mg tablet 1 tablet, Daily    fexofenadine (Allegra) 180 mg tablet Take by mouth. For health maintenace    fluticasone (Flonase) 50 mcg/actuation nasal spray Administer into affected nostril(s). For health maintenace    FreeStyle Scott 3 [place] misc Use as instructed    FreeStyle Scott 3 Sensor device As directed    lancets misc Test blood sugar at least once a day - fasting or 2 hours after a meal    Mounjaro 5 mg, subcutaneous, Every 7 days      Vitals  BP Readings from Last 2 Encounters:   02/20/25 116/71   02/17/25 132/79     BMI Readings from Last 1 Encounters:   02/20/25 35.22 kg/m²      Labs  A1C  Lab Results   Component Value Date    HGBA1C 6.5 02/17/2025    HGBA1C 5.9 11/14/2024    HGBA1C 5.8 (H) 08/05/2024     BMP  Lab Results   Component Value Date    CALCIUM 9.9 08/05/2024     08/05/2024    K 4.5 08/05/2024    CO2 31 08/05/2024     08/05/2024    BUN 17 08/05/2024    CREATININE  1.16 (H) 2024    EGFR 53 (L) 2024     LFTs  Lab Results   Component Value Date    ALT 26 2024    AST 17 2024    ALKPHOS 113 2024    BILITOT 0.7 2024     FLP  Lab Results   Component Value Date    TRIG 174 (H) 2024    CHOL 184 2024    LDLF 84 2023    LDLCALC 103 (H) 2024    HDL 46.7 2024       Weight Management  Wt Readings from Last 3 Encounters:   25 110 kg (242 lb)   25 109 kg (241 lb 3.2 oz)   24 110 kg (243 lb 3.2 oz)       Assessment/Plan   Problem List Items Addressed This Visit       Type 2 diabetes mellitus without complication, without long-term current use of insulin     Patient's goal A1c is < 7%.  Patient is at goal with most recent A1c of 6.5% on 25.   Patient's SMBGs are within goal based on patient's recall of her fasting and post prandial blood glucose.  Patient is currently using FreeStyle Scott 3 - pharmaceutical benefit had provided her with sensors at no cost for initial fill, not clear if it will continued to be covered. Patient also has fingerstick glucometer for back up.  Fasting Bs-90s  After meals 110s -130s (130s if eating a lot of carbs)  Patient confirmed taking Mounjaro 5 mg as directed with minimal side effects. Constipation is still present, but is self-managed.   Endorsed appetite suppression and less food noise, not feeling the need to binge eat.   Did express she does seems to be a little confused on the best approach for healthy diet and interested in working with a nutritionist.  Denied s/sx of hypoglycemia.     Rationale for plan:   Due to patient doing well on Mounjaro 5 mg once weekly with little to no side effects will CONTINUE with Mounjaro 5 mg for glycemic control.   Will enter referral for nutrition services per patient's request to get more education on health diet for Diabetes while on GLP-1 therapy.   Will schedule for in-person visit with clinical pharmacy to go over  education on how to use glucometer and set up her Scott account to be linked with [employer].     Medication Changes:  CONTINUE  Mounjaro 5 mg injected under the skin once weekly         Relevant Medications    tirzepatide (Mounjaro) 5 mg/0.5 mL pen injector    Other Relevant Orders    Referral to Clinical Pharmacy    Referral to Nutrition Services     Clinical Pharmacist follow-up: 04/16/25, In-Person visit    Continue all meds under the continuation of care with the referring provider and clinical pharmacy team.    Thank you,  Vanessa Nichole (Suji), PharmD  PGY-1  Meds Pharmacy Resident     Verbal consent to manage patient's drug therapy was obtained from the patient. They were informed they may decline to participate or withdraw from participation in pharmacy services at any time.

## 2025-04-04 ENCOUNTER — APPOINTMENT (OUTPATIENT)
Dept: PHARMACY | Facility: HOSPITAL | Age: 65
End: 2025-04-04
Payer: COMMERCIAL

## 2025-04-04 DIAGNOSIS — E11.9 TYPE 2 DIABETES MELLITUS WITHOUT COMPLICATION, WITHOUT LONG-TERM CURRENT USE OF INSULIN: ICD-10-CM

## 2025-04-04 RX ORDER — TIRZEPATIDE 5 MG/.5ML
5 INJECTION, SOLUTION SUBCUTANEOUS
Qty: 2 ML | Refills: 0 | Status: SHIPPED | OUTPATIENT
Start: 2025-04-04

## 2025-04-04 NOTE — ASSESSMENT & PLAN NOTE
Patient's goal A1c is < 7%.  Patient is at goal with most recent A1c of 6.5% on 25.   Patient's SMBGs are within goal based on patient's recall of her fasting and post prandial blood glucose.  Patient is currently using FreeStyle Scott 3 - pharmaceutical benefit had provided her with sensors at no cost for initial fill, not clear if it will continued to be covered. Patient also has fingerstick glucometer for back up.  Fasting Bs-90s  After meals 110s -130s (130s if eating a lot of carbs)  Patient confirmed taking Mounjaro 5 mg as directed with minimal side effects. Constipation is still present, but is self-managed.   Endorsed appetite suppression and less food noise, not feeling the need to binge eat.   Did express she does seems to be a little confused on the best approach for healthy diet and interested in working with a nutritionist.  Denied s/sx of hypoglycemia.     Rationale for plan:   Due to patient doing well on Mounjaro 5 mg once weekly with little to no side effects will CONTINUE with Mounjaro 5 mg for glycemic control.   Will enter referral for nutrition services per patient's request to get more education on health diet for Diabetes while on GLP-1 therapy.   Will schedule for in-person visit with clinical pharmacy to go over education on how to use glucometer and set up her [employer] account to be linked with [employer].     Medication Changes:  CONTINUE  Mounjaro 5 mg injected under the skin once weekly

## 2025-04-09 PROCEDURE — RXMED WILLOW AMBULATORY MEDICATION CHARGE

## 2025-04-14 ENCOUNTER — PHARMACY VISIT (OUTPATIENT)
Dept: PHARMACY | Facility: CLINIC | Age: 65
End: 2025-04-14
Payer: COMMERCIAL

## 2025-04-16 ENCOUNTER — APPOINTMENT (OUTPATIENT)
Dept: PRIMARY CARE | Facility: CLINIC | Age: 65
End: 2025-04-16
Payer: COMMERCIAL

## 2025-04-16 DIAGNOSIS — E11.9 TYPE 2 DIABETES MELLITUS WITHOUT COMPLICATION, WITHOUT LONG-TERM CURRENT USE OF INSULIN: ICD-10-CM

## 2025-04-16 NOTE — PROGRESS NOTES
"  Clinical Pharmacy Appointment    Patient ID: Romi Vicente \"Thai" is a 65 y.o. female who presents for diabetes management.     Pt is here for Follow-Up appointment.     Referring Provider: Gwen Coronado MD  PCP: Gwen Coronado MD   Last visit with PCP: 25   Next visit with PCP: 25    Subjective     Interval History  Pt last seen by PCP 25 - POC A1c 6.5% on 25, pt was started on Mounjaro 2.5 mg once weekly. ePA sent and approved. Patient has relevant medical hx of moderate HONEY with an AHI 27.9 (PCP note 24), HTN, and BMI of 35.22 (2025).     At last clinical pharmacy encounter (25) - patient was doing well on Mounjaro 5 mg once weekly. Reported home blood glucose levels were in goal based on CGM readings. Requested in-person visit with clinical pharmacy to go over education on how to use glucometer and set up her [employer] account to be linked with [employer].       HPI  DIABETES MELLITUS TYPE 2:    Diagnosed with diabetes:  2025  Known diabetic complications: N/A  Does patient follow with Endocrinology: N/A  Last optometry exam: ~ a year, due for one  Last foot exam: Does have a podiatrist -- patient denies sores or cuts on feet today      Current diabetic medications include:  Mounjaro 5 mg once weekly ()  Last date taken: N/A - will start additional month on 25  Remaining doses: 4  Adherence: No issues  Adverse effects: constipation, but tolerable and can self manage  Less food noise, decreased appetite    Past diabetic medications include: N/A    Patient reported weights:  25: 241 lbs  25: 237.4 lbs  25: 237.8    Glucose Readings:  Glucometer/CGM Type: Insurance had denied coverage of CGM - patient agreeable to use glucometer    Fasting Bs-90s  After meals 110s -130s (130s if eating a lot of carbs)    Previous home BG readings: N/A    Any episodes of hypoglycemia? Denies  Did patient treat episode of hypoglycemia appropriately? " N/A  Does the patient have a prescription for ready-to-use Glucagon? N/a    Does pt have proteinuria? N/A    Lifestyle: - Has been trying to follow a diet plan - will be seeing nutritionist  Diet: 3 meals/day - weight watchers, food plan  Breakfast: Non-fat greek yogurt with berries, or granola bar (100 pam) or banana  Lunch: Turkey sandwich on whole wheat / egg wrap or a salad  Dinner: Salad with chicken  Snacks: will snack throughout the day and at night  - mozzarella pearls, skinny Pop (individual size), fruit  Drinks: Water, coffee (skim milk), tea    Physical Activity: usually will walk when weather is good, had some back issues    Primary/Secondary Prevention:  Statin? No  ACE-I/ARB? No  Aspirin? No    Pertinent PMH Review:  PMH of Pancreatitis: No  PMH of Retinopathy: No  PMH of Urinary Tract Infections: Yes - but nothing recent and rare  PMH of MTC/MEN2: No  PMH of gastroparesis/severe GI diseases: No    Immunizations:  Influenza? No  COVID? Yes  Pneumonia? Yes  Shingles? Yes  Hepatitis B? No    Medication Reconciliation:  No changes made at this encounter    Drug Interactions  No relevant drug interactions were noted.  Will note that patient on Abilify for depression and anxiety - currently no concerns and controlled  Per Martine - can be associated with weight gain and increase serum glucose    Medication System Management  Patient's preferred pharmacy:   [facility] - [place], [place] - [address] [facility] [address]  Phone: [phone] Fax: [phone]    [facility]/pharmacy #[id number] [place], [place] - [address] [id number]  Phone: [phone] Fax: [phone]    [facility]  [address] [place] [address], [address]  [facility] [address]  Phone: [phone] Fax: [phone]      Objective   Allergies   Allergen Reactions    Bee Pollen Unknown    House Dust Unknown    Methimazole Other     Never been tested, not 100% sure if they  are allergic and has had the flu shot in the past.    Mold Unknown    Other Unknown     Pollen    Pseudoephedrine Unknown    Sudafed [Pseudoephedrine Hcl] Other    Sulfa (Sulfonamide Antibiotics) Unknown    Thimerosal Other     Not sure    Naproxen Rash     Social History     Social History Narrative    Not on file      Medication Review  Current Outpatient Medications   Medication Instructions    acebutolol (SECTRAL) 200 mg, oral, Daily    amLODIPine (Norvasc) 5 mg tablet TAKE 1 TABLET ONCE DAILY FOR ESSENTIAL HYPERTENSION    ARIPiprazole (ABILIFY) 7.5 mg, Daily    BIOTIN ORAL 10 mg    blood sugar diagnostic (Blood Glucose Test) strip Test blood sugar at least once a day - fasting or 2 hours after a meal    blood-glucose meter misc Test blood sugar at least once a day - fasting or 2 hours after a meal    buPROPion XL (WELLBUTRIN XL) 300 mg, Daily    cholecalciferol (VITAMIN D-3) 50 mcg, Daily    CHONDROITIN SULFATE A ORAL 1 capsule, Daily    coenzyme Q-10 200 mg capsule 1 capsule, Daily    dextromethorphan-guaifenesin (MUCINEX DM) 60-1,200 mg tablet extended release 12 hr Take by mouth.    escitalopram (Lexapro) 20 mg tablet 1 tablet, Daily    fexofenadine (Allegra) 180 mg tablet Take by mouth. For health maintenace    fluticasone (Flonase) 50 mcg/actuation nasal spray Administer into affected nostril(s). For health maintenace    FreeStyle Scott 3 [place] misc Use as instructed    FreeStyle Scott 3 Sensor device As directed    lancets misc Test blood sugar at least once a day - fasting or 2 hours after a meal    Mounjaro 5 mg, subcutaneous, Every 7 days      Vitals  BP Readings from Last 2 Encounters:   02/20/25 116/71   02/17/25 132/79     BMI Readings from Last 1 Encounters:   02/20/25 35.22 kg/m²      Labs  A1C  Lab Results   Component Value Date    HGBA1C 6.5 02/17/2025    HGBA1C 5.9 11/14/2024    HGBA1C 5.8 (H) 08/05/2024     BMP  Lab Results   Component Value Date    CALCIUM 9.9 08/05/2024     08/05/2024     K 4.5 08/05/2024    CO2 31 08/05/2024     08/05/2024    BUN 17 08/05/2024    CREATININE 1.16 (H) 08/05/2024    EGFR 53 (L) 08/05/2024     LFTs  Lab Results   Component Value Date    ALT 26 08/05/2024    AST 17 08/05/2024    ALKPHOS 113 08/05/2024    BILITOT 0.7 08/05/2024     FLP  Lab Results   Component Value Date    TRIG 174 (H) 08/05/2024    CHOL 184 08/05/2024    LDLF 84 08/02/2023    LDLCALC 103 (H) 08/05/2024    HDL 46.7 08/05/2024       Weight Management  Wt Readings from Last 3 Encounters:   02/20/25 110 kg (242 lb)   02/17/25 109 kg (241 lb 3.2 oz)   11/14/24 110 kg (243 lb 3.2 oz)       Assessment/Plan   Problem List Items Addressed This Visit       Type 2 diabetes mellitus without complication, without long-term current use of insulin    Patient's goal A1c is < 7%.  Patient is at goal with most recent A1c of 6.5% on 02/17/25.   Patient's SMBGs are within goal based on patient's recall of her fasting and post prandial blood glucose - came in today for in-person education on how to use her fingerstick glucometer. Patient did not have her CGM sensor on at the time of encounter and in the process of getting a replacement.   Send email invite for patient to share her CGM data with clinical pharmacist.  Discussed how to use her OneTouch Verio glucometer  Reviewed blood glucose target goals and when to use fingerstick glucometer for back up versus CGM or to verify low/high CGM readings.   Will follow up in 4 weeks to assess how patient is doing on Mounjaro and assess glycemic control.          Relevant Orders    Referral to Clinical Pharmacy     Clinical Pharmacist follow-up: 05/09/25, Telehealth visit    Continue all meds under the continuation of care with the referring provider and clinical pharmacy team.    Thank you,  Vanessa Nichole (Suji), PharmD  PGY-1  Meds Pharmacy Resident     Verbal consent to manage patient's drug therapy was obtained from the patient. They were informed they may decline to  participate or withdraw from participation in pharmacy services at any time.

## 2025-04-16 NOTE — ASSESSMENT & PLAN NOTE
Patient's goal A1c is < 7%.  Patient is at goal with most recent A1c of 6.5% on 02/17/25.   Patient's SMBGs are within goal based on patient's recall of her fasting and post prandial blood glucose - came in today for in-person education on how to use her fingerstick glucometer. Patient did not have her CGM sensor on at the time of encounter and in the process of getting a replacement.   Send email invite for patient to share her CGM data with clinical pharmacist.  Discussed how to use her OneTouch Verio glucometer  Reviewed blood glucose target goals and when to use fingerstick glucometer for back up versus CGM or to verify low/high CGM readings.   Will follow up in 4 weeks to assess how patient is doing on Mounjaro and assess glycemic control.

## 2025-04-30 ENCOUNTER — TREATMENT (OUTPATIENT)
Dept: PHYSICAL THERAPY | Facility: CLINIC | Age: 65
End: 2025-04-30
Payer: COMMERCIAL

## 2025-04-30 DIAGNOSIS — M75.82 TENDINITIS OF LEFT ROTATOR CUFF: ICD-10-CM

## 2025-04-30 PROCEDURE — 97140 MANUAL THERAPY 1/> REGIONS: CPT | Mod: GP | Performed by: PHYSICAL THERAPIST

## 2025-04-30 PROCEDURE — 97110 THERAPEUTIC EXERCISES: CPT | Mod: GP | Performed by: PHYSICAL THERAPIST

## 2025-04-30 NOTE — PROGRESS NOTES
"Physical Therapy Follow-up    Patient Name: Romi Vicente \"Saria\"  MRN: [id number]  Today's Date: 5/1/2025  Time Calculation  Start Time: 0848  Stop Time: 0929  Time Calculation (min): 41 min      Visit#: 8. 30 approved.  Cert dates: NA    Precautions: None.    Subjective: Decr pain. Easier to reach. HEP going well.    Objective: AROM shoulder flex: wnl. Abd: wnl- min pain ~160 to end of range. ER: wnl. HBB: ~T10- pain. Reaching into cupboard, reaching across for seat belt and left sidelying all wnl. Reaching behind back for dressing still painful, bu improving. HEP going well. Thinks she is ready for self management.    Treatment:   Therapeutic exercise:   Ball rolling up wall into flex, ball rolling- small circles on wall, Tband row, Tband IR, Tband bilat ER, Tband hand walks up wall, sleeper stretch- all demo'd correctly.     Manual therapy: IV-- GH caud and AP in abd, IR(90)// AROM Abd: wnl- decr pain.      Assessment:  Demo's HEP correctly. All goals nearly achieved. Ready for self management.     Plan: Discharge with HEP.    "

## 2025-05-02 ENCOUNTER — TELEMEDICINE CLINICAL SUPPORT (OUTPATIENT)
Dept: NUTRITION | Facility: HOSPITAL | Age: 65
End: 2025-05-02
Payer: COMMERCIAL

## 2025-05-02 VITALS — HEIGHT: 69 IN | BODY MASS INDEX: 35.74 KG/M2

## 2025-05-02 DIAGNOSIS — E11.9 TYPE 2 DIABETES MELLITUS WITHOUT COMPLICATION, WITHOUT LONG-TERM CURRENT USE OF INSULIN: Primary | ICD-10-CM

## 2025-05-02 PROCEDURE — 97802 MEDICAL NUTRITION INDIV IN: CPT | Mod: 95 | Performed by: DIETITIAN, REGISTERED

## 2025-05-02 NOTE — PATIENT INSTRUCTIONS
For Diabetes:  Consistency of meals is important!  Eat 3 meals per day with snacks.  Eat an hour after waking up and don't go more than 3-4 hours without eating.    Keep carbohydrates consistent from meal to meal.  Aim for 30-45 g of carbohydrate per meal.  Refer to carbohydrate lists for serving sizes.   Read labels- 15g of total carbohydrate is a serving.     - Choose foods that have fiber listed on the labels.  This helps slow down the release of sugar in your bloodstream.    Include a source of protein with all meals and snack such as meat, fish, nuts, peanut butter, yogurt, cottage cheese or eggs.    - For high protein snack ideas check out: https://www.[employer].com/article/053359/10-high-protein-snacks-that-keep-you-feeling-full-longer/  - Aim for 30 g of protein per meal.   Try to reduce your weight by 1-2# per week- this can help lower blood sugar as well.     - see 1200 and 1500 calorie sample meal plans for ideas.    6.  Drink 64 oz of water other non-caloric beverage.  7.  Aim for 30 minutes of exercise such as walking on most days.

## 2025-05-02 NOTE — PROGRESS NOTES
"Nutrition Assessment     Reason for Visit:  Romi Vicente \"Thai" is a 65 y.o. female who presents for nutrition counseling seeking weight management.  Virtual or Telephone Consent    While technically available, the patient was unable or unwilling to consent to connect via audio/video telehealth technology; therefore, I performed this visit using a real-time audio only connection between Romi Vicente & Alize Walker RD, LD.  Verbal consent was requested and obtained from Romi Vicente on this date, 05/02/25 for a telehealth visit and the patient's location was confirmed at the time of the visit.      Anthropometrics:  Wt Readings from Last 10 Encounters:   02/20/25 110 kg (242 lb)   02/17/25 109 kg (241 lb 3.2 oz)   11/14/24 110 kg (243 lb 3.2 oz)   08/12/24 110 kg (242 lb 4 oz)   06/19/24 109 kg (240 lb)   05/02/24 109 kg (239 lb 3.2 oz)   02/22/24 111 kg (245 lb 4.8 oz)   02/01/24 114 kg (251 lb 12.8 oz)   08/03/23 109 kg (240 lb)   03/30/23 110 kg (243 lb 9.6 oz)     Body mass index is 35.74 kg/m².    Anthropometrics  Height: 175.3 cm (5' 9\")   Food And Nutrient Intake:  Food and Nutrient History  Food and Nutrient History: Pt presents for nutrition counseling in the context of type II DM.  She is currently taking a low dose of Mounjaro which is helping to redice her snacking habits.  Pt has also been following a WW plan and tracking on WW cade.  She has obtained a CGM and blood sugars have been mostly within range.  Diet recall reveals pt is eating 3 meals per day and an evening snack.  Encouraged her to eat small frequent meals if she should feels a loss of appetitie or too full.  Encouraged she aim for 30 g of protein at meals and to include protein with all snacks.  Recommend a consistent carbohydrate diet (30g per meal) and high protein snacks.  Suggested she consider walking more following meals as well.     Food Intake  Meal 1: yogurt and fruit  Snacks: whole wheat bread and turkey; chopped salad, " "; mozzarella;  Meal 2: snacks; no longer; fruit  Meal 3: dinner: salad and grilled chicken  Snacks: skinny pop  Fluid Intake: water; diet Pepsi , coffee  Pattern of Alcohol Consumption: none     Bioactive Substance Intake  Pattern of Alcohol Consumption: none     Micronutrient Intake  Vitamin Intake: D    Food Supplement Intake  Vitamin Intake: D    Medication and Complementary/Alternative Medicine Use  OTC Medication Use: Biotin  Physical Activities:  Physical Activity  Frequency (times/week): 2 (times/week)  Duration (minutes/day): 30 minutes/day  Type of Physical Activity: walks            Nutrition Assessment     Reason for Visit:  Romi Vicente \"Thai" is a 65 y.o. female who presents for No chief complaint on file.    Anthropometrics:  Anthropometrics  Height: 175.3 cm (5' 9\")         Food And Nutrient Intake:  Food and Nutrient History  Food and Nutrient History: Pt presents for nutrition counseling in the context of type II DM.  She is currently taking a low dose of Mounjaro which is helping to redice her snacking habits.  Pt has also been following a WW plan and tracking on WW cade.  She has obtained a CGM and blood sugars have been mostly within range.  Diet recall reveals pt is eating 3 meals per day and an evening snack.  Encouraged her to eat small frequent meals if she should feels a loss of appetitie or too full.  Encouraged she aim for 30 g of protein at meals and to include protein with all snacks.  Recommend a consistent carbohydrate diet (30g per meal) and high protein snacks.  Suggested she consider walking more following meals as well.     Food Intake  Meal 1: yogurt and fruit  Snacks: whole wheat bread and turkey; chopped salad, ; mozzarella;  Meal 2: snacks; no longer; fruit  Meal 3: dinner: salad and grilled chicken  Snacks: skinny pop  Fluid Intake: water; diet Pepsi , coffee  Pattern of Alcohol Consumption: none       Bioactive Substance Intake  Pattern of Alcohol Consumption: none     " "    Micronutrient Intake  Vitamin Intake: D    Food Supplement Intake  Vitamin Intake: D    Medication and Complementary/Alternative Medicine Use  OTC Medication Use: Biotin      Physical Activities:  Physical Activity  Frequency (times/week): 2 (times/week)  Duration (minutes/day): 30 minutes/day  Type of Physical Activity: walks       Nutrition Focused Physical Exam:  Subcutaneous Fat Loss  Defer Subcutaneous Fat Loss Assessment: Defer all  Defer All Reason: telehealth visit  Muscle Wasting  Defer Muscle Wasting Assessment: Defer all  Physical Findings  Digestive System Findings: Constipation        Energy Needs  Calculated Energy Needs Using Equations  Height: 175.3 cm (5' 9\")  Weight Used for Equation Calculations: 110 kg (242 lb 8.1 oz)  [place] Equation (Overweight or Obese Patients): 1709  Activity Factor: 1.2  Total Energy Needs: 2020  Estimated Energy Needs  Total Energy Estimated Needs in 24 hours (kCal): 1551 kCal  Method for Estimating Needs: MSJ x 1.2 -500 kcals  Estimated Protein Needs  Total Protein Estimated Needs in 24 Hours (g): 78 g  Method for Estimating 24 Hour Protein Needs: 20% of est calories        Nutrition Diagnosis   Malnutrition Diagnosis  Patient has Malnutrition Diagnosis: No  Nutrition Diagnosis  Patient has Nutrition Diagnosis: Yes  Diagnosis Status (1): New  Nutrition Diagnosis 1: Obesity class II  Related to (1): type II DM  As Evidenced by (1): elevated HgbA1c  Additional Nutrition Diagnosis: Diagnosis 2  Diagnosis Status (2): New  Related to (2): predected excessive enrgy intake  As Evidenced by (2): BMI of 35    Nutrition Interventions/Recommendations   Food and Nutrition Delivery  Meals & Snacks: Carbohydrate-modified diet, Energy-modified diet, General Healthful Diet  Goals: 30 g carb per meal; 3 meals with 1-2 snacks per day; ~1200 daily kcal intake; ensure adequate protein sources with each meal and snack~ 1500 kcalss/day  Nutrition Education  Nutrition Education " "Content: Content related nutrition education, Education on nutrition's influence on health, Physical activity guidance  Goals: Instructed on consistent carbohydrate intake, blood sugar goals, exercise, proper portion sizes, label reading, and general healthful nutrition. Instructed on benefits of wt loss with diabetes and heart health. Both verbal and written education provided in the one-on-one setting.Pt verbalized understanding of information provided. All questions answered to pt satisfaction throughout visit.  Nutrition Counseling  Theoretical Basis/Approach: Nutrition counseling based on health belief model  Nutrition Counseling Strategies : Nutrition counseling based on self-monitoring strategy, Nutrition counseling based on goal setting strategy      Nutrition Assessment     Reason for Visit:  Romi Vicente \"Saira\" is a 65 y.o. female who presents for No chief complaint on file.    Anthropometrics:  Anthropometrics  Height: 175.3 cm (5' 9\")  Lab Results   Component Value Date    HGBA1C 6.5 02/17/2025      Meds: Mounjaro    Food And Nutrient Intake:  Food and Nutrient History  Food and Nutrient History: Pt presents for nutrition counseling in the context of type II DM.  She is currently taking a low dose of Mounjaro which is helping to redice her snacking habits.  Pt has also been following a WW plan and tracking on WW cade.  She has obtained a CGM and blood sugars have been mostly within range.  Diet recall reveals pt is eating 3 meals per day and an evening snack.  Encouraged her to eat small frequent meals if she should feels a loss of appetitie or too full.  Encouraged she aim for 30 g of protein at meals and to include protein with all snacks.  Recommend a consistent carbohydrate diet (30g per meal) and high protein snacks.  Suggested she consider walking more following meals as well.     Food Intake  Meal 1: yogurt and fruit  Snacks: whole wheat bread and turkey; chopped salad, ; mozzarella;  Meal 2: " "snacks; no longer; fruit  Meal 3: dinner: salad and grilled chicken  Snacks: skinny pop  Fluid Intake: water; diet Pepsi , coffee  Pattern of Alcohol Consumption: none        Bioactive Substance Intake  Pattern of Alcohol Consumption: none     Micronutrient Intake  Vitamin Intake: D      Medication and Complementary/Alternative Medicine Use  OTC Medication Use: Biotin    Physical Activities:  Physical Activity  Frequency (times/week): 2 (times/week)  Duration (minutes/day): 30 minutes/day  Type of Physical Activity: walks     Nutrition Focused Physical Exam:  Subcutaneous Fat Loss  Defer Subcutaneous Fat Loss Assessment: Defer all  Defer All Reason: telehealth visit  Muscle Wasting  Defer Muscle Wasting Assessment: Defer all  Physical Findings  Digestive System Findings: Constipation    Energy Needs  Calculated Energy Needs Using Equations  Height: 175.3 cm (5' 9\")  Weight Used for Equation Calculations: 110 kg (242 lb 8.1 oz)  [place]elle Flynn Equation (Overweight or Obese Patients): 1709  Activity Factor: 1.2  Total Energy Needs: 2020  Estimated Energy Needs  Total Energy Estimated Needs in 24 hours (kCal): 1551 kCal  Method for Estimating Needs: MSJ x 1.2 -500 kcals  Estimated Protein Needs  Total Protein Estimated Needs in 24 Hours (g): 78 g  Method for Estimating 24 Hour Protein Needs: 20% of est calories      Nutrition Diagnosis   Malnutrition Diagnosis  Patient has Malnutrition Diagnosis: No  Nutrition Diagnosis  Patient has Nutrition Diagnosis: Yes  Diagnosis Status (1): New  Nutrition Diagnosis 1: Obesity class II  Related to (1): type II DM  As Evidenced by (1): elevated HgbA1c  Additional Nutrition Diagnosis: Diagnosis 2  Diagnosis Status (2): New  Related to (2): predected excessive enrgy intake  As Evidenced by (2): BMI of 35    Nutrition Interventions/Recommendations   Food and Nutrition Delivery  Meals & Snacks: Carbohydrate-modified diet, Energy-modified diet, General Healthful Diet  Goals: 30 g " carb per meal; 3 meals with 1-2 snacks per day; ~1200 daily kcal intake; ensure adequate protein sources with each meal and snack~ 1500 kcalss/day  Nutrition Education  Nutrition Education Content: Content related nutrition education, Education on nutrition's influence on health, Physical activity guidance  Goals: Instructed on consistent carbohydrate intake, blood sugar goals, exercise, proper portion sizes, label reading, and general healthful nutrition. Instructed on benefits of wt loss with diabetes and heart health. Both verbal and written education provided in the one-on-one setting.Pt verbalized understanding of information provided. All questions answered to pt satisfaction throughout visit.  Nutrition Counseling  Theoretical Basis/Approach: Nutrition counseling based on health belief model  Nutrition Counseling Strategies : Nutrition counseling based on self-monitoring strategy, Nutrition counseling based on goal setting strategy    Patient Instructions   For Diabetes:  Consistency of meals is important!  Eat 3 meals per day with snacks.  Eat an hour after waking up and don't go more than 3-4 hours without eating.    Keep carbohydrates consistent from meal to meal.  Aim for 30-45 g of carbohydrate per meal.  Refer to carbohydrate lists for serving sizes.   Read labels- 15g of total carbohydrate is a serving.     - Choose foods that have fiber listed on the labels.  This helps slow down the release of sugar in your bloodstream.    Include a source of protein with all meals and snack such as meat, fish, nuts, peanut butter, yogurt, cottage cheese or eggs.    - For high protein snack ideas check out: https://www.BigFix.com/article/224015/10-high-protein-snacks-that-keep-you-feeling-full-longer/  - Aim for 30 g of protein per meal.   Try to reduce your weight by 1-2# per week- this can help lower blood sugar as well.     - see 1200 and 1500 calorie sample meal plans for ideas.    6.  Drink 64 oz of water  other non-caloric beverage.  7.  Aim for 30 minutes of exercise such as walking on most days.        Nutrition Monitoring and Evaluation   Food and Nutrient Intake  Monitoring and Evaluation Plan: Energy intake, Fluid intake, Meal/snack pattern, Protein intake, Carbohydrate intake, Fiber intake  Criteria: consume 8575-2430 kcal/day  Criteria: aim for 64 oz of water daily, limit sugar sweetened beveraged  Criteria: 3 meals per day with 1-2 snacks between meals  Criteria: ensure adequate protein at each meal and snack ~ 30 g/meal  Estimated carbohydrate intake: Estimated carbohydrate intake  Criteria: 30-45 g carb per meal  Criteria: ensure adequate fiber is present at meals - focus on 1/2 plate nonstarchy vegetables and choosing whole grain foods when able  Knowledge Belief Attitude Determination   Monitoring and Evaluation Plan: Physical activity  Criteria: Encouraged regular physical activity including strength training as medically appropriate per AHA guidelines  Anthropometric measurements  Monitoring and Evaluation Plan: Weight change  Criteria: 1-2 lb wt loss per week  Biochemical Data, Medical Tests and Procedures  Monitoring and Evaluation Plan: Glucose/endocrine profile  Glucose/Endocrine Profile: Hemoglobin A1c (HgbA1c), Glucose, fasting  Criteria: A1c <7%; fasting blood glucose  mg/dl      Time Spent  Time spent directly with patient, family or caregiver: 25 minutes  Documentation Time: 15 minutes      Readiness to Change : Good  Level of Understanding : Good  Anticipated Compliant : Good

## 2025-05-08 NOTE — PROGRESS NOTES
"  Clinical Pharmacy Appointment    Patient ID: Romi Vicente \"Thai" is a 65 y.o. female who presents for diabetes management.     Pt is here for Follow-Up appointment.     Referring Provider: Gwen Coronado MD  PCP: Gwen Coronado MD   Last visit with PCP: 02/17/25   Next visit with PCP: 06/05/25    Subjective     Interval History  Pt last seen by PCP 02/17/25 - POC A1c 6.5% on 02/17/25, pt was started on Mounjaro 2.5 mg once weekly. ePA sent and approved. Patient has relevant medical hx of moderate HONEY with an AHI 27.9 (PCP note 05/02/24), HTN, and BMI of 35.22 (02/20/2025).     Clinical pharmacy encounter (03/14/25) - patient was tolerating Mounjaro 2.5 mg with minimal side effects, increased to 5 mg once weekly as the initial 2.5 mg dose is not intended to be a therapeutic dose for glycemic control.     At last clinical pharmacy encounter (04/04/25) - Patient reported some constipation with Mounjaro 5 mg, however it was manageable. Continued on Mounjaro 5 mg once weekly and also expressed interest in a nutrition services referral. Patient had requested in-person visit for assistance on how to use home blood glucose testing supplies.       HPI  DIABETES MELLITUS TYPE 2:    Diagnosed with diabetes:  02/2025  Known diabetic complications: N/A  Does patient follow with Endocrinology: N/A  Last optometry exam: ~ a year, due for one  Last foot exam: Does have a podiatrist -- patient denies sores or cuts on feet today      Current diabetic medications include:  Mounjaro 5 mg once weekly (Fridays)  Last date taken: 05/09/25  Remaining doses: 0  Adherence: No issues  Adverse effects: denies    Past diabetic medications include: N/A    Patient reported weights:  02/21/25: 241 lbs  03/14/25: 237.4 lbs  04/04/25: 237.8  05/09/25: 232 lbs.     Glucose Readings:  Glucometer/CGM Type: Insurance had denied coverage of CGM - patient agreeable to use glucometer  Daily average 107 mg/dL per patient recall from FSL " 3    Previous home BG readings:   Fasting Bs-90s  After meals 110s -130s (130s if eating a lot of carbs)    Any episodes of hypoglycemia? Denies  Did patient treat episode of hypoglycemia appropriately? N/A  Does the patient have a prescription for ready-to-use Glucagon? N/a    Does pt have proteinuria? N/A    Lifestyle:   Diet: 3 meals/day: Met with nutritionist - helped with meal planning and diet  Has been trying to follow a diet plan - interested in nutritionist, referral ordered today  Weight watchers, food plan  Breakfast: Non-fat greek yogurt with berries, or granola bar (100 pam) or banana  Lunch: Turkey sandwich on whole wheat / egg wrap or a salad  Dinner: Salad with chicken  Snacks: will snack throughout the day and at night  - mozzarella pearls, skinny Pop (individual size), fruit  Drinks: Water, coffee (skim milk), tea    Physical Activity: no new changes  usually will walk when weather is good, had some back issues    Primary/Secondary Prevention:  Statin? No  ACE-I/ARB? No  Aspirin? No    Pertinent PMH Review:  PMH of Pancreatitis: No  PMH of Retinopathy: No  PMH of Urinary Tract Infections: Yes - but nothing recent and rare  PMH of MTC/MEN2: No  PMH of gastroparesis/severe GI diseases: No    Immunizations:  Influenza? No  COVID? Yes  Pneumonia? Yes  Shingles? Yes  Hepatitis B? No    Medication Reconciliation:  No changes made at this encounter    Drug Interactions  No relevant drug interactions were noted.  Will note that patient on Abilify for depression and anxiety - currently no concerns and controlled  Per Martine - can be associated with weight gain and increase serum glucose    Medication System Management  Patient's preferred pharmacy:   [facility] - [place], [place] - [address]  [address] [address]  Phone: [phone] Fax: [phone]    [id number] - [place], [place] - [address]  [address] [address] [address]  Phone:  [phone] Fax: [phone]    [facility]  [address], [address]  [facility] [address]  Phone: [phone] Fax: [phone]      Objective   Allergies   Allergen Reactions    Bee Pollen Unknown    House Dust Unknown    Methimazole Other     Never been tested, not 100% sure if they are allergic and has had the flu shot in the past.    Mold Unknown    Other Unknown     Pollen    Pseudoephedrine Unknown    Sudafed [Pseudoephedrine Hcl] Other    Sulfa (Sulfonamide Antibiotics) Unknown    Thimerosal Other     Not sure    Naproxen Rash     Social History     Social History Narrative    Not on file      Medication Review  Current Outpatient Medications   Medication Instructions    acebutolol (SECTRAL) 200 mg, oral, Daily    amLODIPine (Norvasc) 5 mg tablet TAKE 1 TABLET ONCE DAILY FOR ESSENTIAL HYPERTENSION    ARIPiprazole (ABILIFY) 7.5 mg, Daily    BIOTIN ORAL 10 mg    blood sugar diagnostic (Blood Glucose Test) strip Test blood sugar at least once a day - fasting or 2 hours after a meal    blood-glucose meter misc Test blood sugar at least once a day - fasting or 2 hours after a meal    buPROPion XL (WELLBUTRIN XL) 300 mg, Daily    cholecalciferol (VITAMIN D-3) 50 mcg, Daily    CHONDROITIN SULFATE A ORAL 1 capsule, Daily    coenzyme Q-10 200 mg capsule 1 capsule, Daily    dextromethorphan-guaifenesin (MUCINEX DM) 60-1,200 mg tablet extended release 12 hr Take by mouth.    escitalopram (Lexapro) 20 mg tablet 1 tablet, Daily    fexofenadine (Allegra) 180 mg tablet Take by mouth. For health maintenace    fluticasone (Flonase) 50 mcg/actuation nasal spray Administer into affected nostril(s). For health maintenace    FreeStyle Scott 3 [place] misc Use as instructed    FreeStyle Scott 3 Sensor device As directed    lancets misc Test blood sugar at least once a day - fasting or 2 hours after a meal    Mounjaro 5 mg, subcutaneous, Every 7 days      Vitals  BP Readings from Last 2 Encounters:   02/20/25  116/71   02/17/25 132/79     BMI Readings from Last 1 Encounters:   05/02/25 35.74 kg/m²      Labs  A1C  Lab Results   Component Value Date    HGBA1C 6.5 02/17/2025    HGBA1C 5.9 11/14/2024    HGBA1C 5.8 (H) 08/05/2024     BMP  Lab Results   Component Value Date    CALCIUM 9.9 08/05/2024     08/05/2024    K 4.5 08/05/2024    CO2 31 08/05/2024     08/05/2024    BUN 17 08/05/2024    CREATININE 1.16 (H) 08/05/2024    EGFR 53 (L) 08/05/2024     LFTs  Lab Results   Component Value Date    ALT 26 08/05/2024    AST 17 08/05/2024    ALKPHOS 113 08/05/2024    BILITOT 0.7 08/05/2024     FLP  Lab Results   Component Value Date    TRIG 174 (H) 08/05/2024    CHOL 184 08/05/2024    LDLF 84 08/02/2023    LDLCALC 103 (H) 08/05/2024    HDL 46.7 08/05/2024       Weight Management  Wt Readings from Last 3 Encounters:   02/20/25 110 kg (242 lb)   02/17/25 109 kg (241 lb 3.2 oz)   11/14/24 110 kg (243 lb 3.2 oz)       Assessment/Plan   Problem List Items Addressed This Visit       Type 2 diabetes mellitus without complication, without long-term current use of insulin    Patient's goal A1c is < 7%.  Patient is at goal with most recent A1c of 6.5% on 02/17/25.  Patient's SMBGs are generally controlled - was having issues with FSL 3 sensors and some data was lost. Was not able to see sensor data yet. Patient reported her average glucose was 107 mg/dL.    Patient denied any adherence issues or adverse side effects with Mounjaro 5 mg once weekly.   Reported still having appetite suppression and stable weight loss.   Lost 9 lbs. Since start of therapy.  Denied any s/sx of hypoglycemia.  Patient also met with nutrition services and got additional assistance with meal planning and diet plans for Type 2 diabetes and while on Mounjaro.     Rationale for plan:   Due to patient doing well on Mounjaro 5 mg once weekly with little to no side effects will CONTINUE with Mounjaro 5 mg for glycemic control and to see if patient can continue  to lose weight on current dose.   Will follow up in 4 weeks to assess glycemic control, weight loss progress, and dose increase if appropriate.     Medication Changes:  CONTINUE  Mounjaro 5 mg injected under the skin once weekly         Relevant Medications    tirzepatide (Mounjaro) 5 mg/0.5 mL pen injector    Other Relevant Orders    Referral to Clinical Pharmacy     Clinical Pharmacist follow-up: 06/06/25, Telehealth visit    Continue all meds under the continuation of care with the referring provider and clinical pharmacy team.    Thank you,  Vanessa Nichole (Suji), PharmD  PGY-1  Meds Pharmacy Resident     Verbal consent to manage patient's drug therapy was obtained from the patient. They were informed they may decline to participate or withdraw from participation in pharmacy services at any time.

## 2025-05-09 ENCOUNTER — APPOINTMENT (OUTPATIENT)
Dept: PHARMACY | Facility: HOSPITAL | Age: 65
End: 2025-05-09
Payer: COMMERCIAL

## 2025-05-09 ENCOUNTER — TELEMEDICINE (OUTPATIENT)
Dept: PHARMACY | Facility: HOSPITAL | Age: 65
End: 2025-05-09
Payer: COMMERCIAL

## 2025-05-09 DIAGNOSIS — E11.9 TYPE 2 DIABETES MELLITUS WITHOUT COMPLICATION, WITHOUT LONG-TERM CURRENT USE OF INSULIN: ICD-10-CM

## 2025-05-09 PROCEDURE — RXMED WILLOW AMBULATORY MEDICATION CHARGE

## 2025-05-09 RX ORDER — BLOOD-GLUCOSE SENSOR
EACH MISCELLANEOUS
Qty: 3 EACH | Refills: 11 | Status: SHIPPED | OUTPATIENT
Start: 2025-05-09

## 2025-05-09 RX ORDER — TIRZEPATIDE 5 MG/.5ML
5 INJECTION, SOLUTION SUBCUTANEOUS
Qty: 2 ML | Refills: 0 | Status: SHIPPED | OUTPATIENT
Start: 2025-05-09

## 2025-05-09 NOTE — ASSESSMENT & PLAN NOTE
Patient's goal A1c is < 7%.  Patient is at goal with most recent A1c of 6.5% on 02/17/25.  Patient's SMBGs are generally controlled - was having issues with FSL 3 sensors and some data was lost. Was not able to see sensor data yet. Patient reported her average glucose was 107 mg/dL.    Patient denied any adherence issues or adverse side effects with Mounjaro 5 mg once weekly.   Reported still having appetite suppression and stable weight loss.   Lost 9 lbs. Since start of therapy.  Denied any s/sx of hypoglycemia.  Patient also met with nutrition services and got additional assistance with meal planning and diet plans for Type 2 diabetes and while on Mounjaro.     Rationale for plan:   Due to patient doing well on Mounjaro 5 mg once weekly with little to no side effects will CONTINUE with Mounjaro 5 mg for glycemic control and to see if patient can continue to lose weight on current dose.   Will follow up in 4 weeks to assess glycemic control, weight loss progress, and dose increase if appropriate.     Medication Changes:  CONTINUE  Mounjaro 5 mg injected under the skin once weekly

## 2025-05-13 ENCOUNTER — PHARMACY VISIT (OUTPATIENT)
Dept: PHARMACY | Facility: CLINIC | Age: 65
End: 2025-05-13
Payer: COMMERCIAL

## 2025-06-05 ENCOUNTER — APPOINTMENT (OUTPATIENT)
Dept: PRIMARY CARE | Facility: CLINIC | Age: 65
End: 2025-06-05
Payer: COMMERCIAL

## 2025-06-05 VITALS
SYSTOLIC BLOOD PRESSURE: 115 MMHG | HEIGHT: 69 IN | HEART RATE: 78 BPM | WEIGHT: 231.8 LBS | BODY MASS INDEX: 34.33 KG/M2 | TEMPERATURE: 97.2 F | OXYGEN SATURATION: 96 % | DIASTOLIC BLOOD PRESSURE: 74 MMHG

## 2025-06-05 DIAGNOSIS — E66.811 CLASS 1 DRUG-INDUCED OBESITY WITH SERIOUS COMORBIDITY AND BODY MASS INDEX (BMI) OF 34.0 TO 34.9 IN ADULT: ICD-10-CM

## 2025-06-05 DIAGNOSIS — I10 ESSENTIAL HYPERTENSION: ICD-10-CM

## 2025-06-05 DIAGNOSIS — Z00.00 ROUTINE GENERAL MEDICAL EXAMINATION AT A HEALTH CARE FACILITY: ICD-10-CM

## 2025-06-05 DIAGNOSIS — E66.1 CLASS 1 DRUG-INDUCED OBESITY WITH SERIOUS COMORBIDITY AND BODY MASS INDEX (BMI) OF 34.0 TO 34.9 IN ADULT: ICD-10-CM

## 2025-06-05 DIAGNOSIS — E11.9 TYPE 2 DIABETES MELLITUS WITHOUT COMPLICATION, WITHOUT LONG-TERM CURRENT USE OF INSULIN: Primary | ICD-10-CM

## 2025-06-05 PROBLEM — E66.01 SEVERE OBESITY (BMI 35.0-35.9 WITH COMORBIDITY) (MULTI): Status: RESOLVED | Noted: 2023-02-17 | Resolved: 2025-06-05

## 2025-06-05 LAB — POC HEMOGLOBIN A1C: 5.5 % (ref 4.2–6.5)

## 2025-06-05 PROCEDURE — 3074F SYST BP LT 130 MM HG: CPT | Performed by: INTERNAL MEDICINE

## 2025-06-05 PROCEDURE — 3044F HG A1C LEVEL LT 7.0%: CPT | Performed by: INTERNAL MEDICINE

## 2025-06-05 PROCEDURE — 1036F TOBACCO NON-USER: CPT | Performed by: INTERNAL MEDICINE

## 2025-06-05 PROCEDURE — 1159F MED LIST DOCD IN RCRD: CPT | Performed by: INTERNAL MEDICINE

## 2025-06-05 PROCEDURE — 3008F BODY MASS INDEX DOCD: CPT | Performed by: INTERNAL MEDICINE

## 2025-06-05 PROCEDURE — 99214 OFFICE O/P EST MOD 30 MIN: CPT | Performed by: INTERNAL MEDICINE

## 2025-06-05 PROCEDURE — 83036 HEMOGLOBIN GLYCOSYLATED A1C: CPT | Performed by: INTERNAL MEDICINE

## 2025-06-05 PROCEDURE — 3078F DIAST BP <80 MM HG: CPT | Performed by: INTERNAL MEDICINE

## 2025-06-05 ASSESSMENT — ENCOUNTER SYMPTOMS
APPETITE CHANGE: 0
ACTIVITY CHANGE: 0
SHORTNESS OF BREATH: 0
PALPITATIONS: 0
COUGH: 0

## 2025-06-05 NOTE — PROGRESS NOTES
"Subjective   Patient ID: Romi Vicente \"Saira\" is a 65 y.o. female who presents for Follow-up.  HPI  History of Present Illness  The patient is a 65-year-old female who comes in today for follow-up.    Weight Loss  - She has lost 11 pounds since her last appointment in February.  - She reports a weight loss of 11 pounds, with her current weight being 231 pounds.  - Her home scale typically registers a lower weight, and her weight was recorded as 232 pounds at [employer] the previous week.    Medication Management  - She has been working with our pharmacy team and has been on Mounjaro.  - She is considering an increase in her Mounjaro dosage to 7.5 mg, as she has begun to experience slight hunger cues.  - She reports feeling well on the Mounjaro regimen, despite initial discomfort characterized by unusual sensations and mild nausea.  - She does not experience any constipation or other abnormal sensations.  - Her Mounjaro dosage was increased to 5 mg on 03/14/2025.    Supplemental information: None.     Review of Systems   Constitutional:  Negative for activity change and appetite change.   Respiratory:  Negative for cough and shortness of breath.    Cardiovascular:  Negative for chest pain, palpitations and leg swelling.       Objective   Visit Vitals  /74   Pulse 78   Temp 36.2 °C (97.2 °F)   Ht 1.753 m (5' 9\")   Wt 105 kg (231 lb 12.8 oz)   SpO2 96%   BMI 34.23 kg/m²   Smoking Status Never   BSA 2.26 m²      Patient Health Questionnaire-2 Score: 0 (6/5/2025  1:58 PM)     Physical Exam  Vitals and nursing note reviewed.   Cardiovascular:      Rate and Rhythm: Normal rate and regular rhythm.   Pulmonary:      Effort: Pulmonary effort is normal.      Breath sounds: Normal breath sounds.         Results  - Labs:    - A1c: 5.5     Assessment & Plan  1. Weight management  - Significant weight loss of 11 pounds since the last visit in 02/2025, reducing weight from 243 to 231 pounds  - BMI decreased from 35 to " 34  - Currently on Mounjaro 5 mg, reports feeling good with no significant side effects; initial mild nausea resolved  - Considering increasing the dose to 7.5 mg and will discuss this with Vanessa tomorrow    2. Diabetes Mellitus  - A1c level is commendably at 5.5, indicating good glycemic control  - Current medication regimen with Mounjaro appears to be effective  - Discussed the effectiveness of Mounjaro for weight loss in diabetics  - Follow-up scheduled in 3 to 4 months     1. Type 2 diabetes mellitus without complication, without long-term current use of insulin  POCT glycosylated hemoglobin (Hb A1C) manually resulted      2. Essential hypertension        3. Class 1 drug-induced obesity with serious comorbidity and body mass index (BMI) of 34.0 to 34.9 in adult            Gwen Coronado MD   This medical note was created with the assistance of artificial intelligence (AI) for documentation purposes. The content has been reviewed and confirmed by the healthcare provider for accuracy and completeness. Patient consented to the use of audio recording and use of AI during their visit.

## 2025-06-05 NOTE — PROGRESS NOTES
"  Clinical Pharmacy Appointment    Patient ID: Romi Vicente \"Thai" is a 65 y.o. female who presents for diabetes management.     Pt is here for Follow-Up appointment.     Referring Provider: Gwen Coronado MD  PCP: Gwen Coronado MD   Last visit with PCP: 25  Next visit with PCP: 25    Subjective     Interval History  Pt last seen by PCP 25 - POC A1c 6.5% on 25, pt was started on Mounjaro 2.5 mg once weekly. ePA sent and approved. Patient has relevant medical hx of moderate HONEY with an AHI 27.9 (PCP note 24), HTN, and BMI of 35.22 (2025).     At last clinical pharmacy encounter (25) - Patient reported little to no side effects, therefore continued with Mounjaro 5 mg to see if patient can continue to have weight loss on the 5 mg dose.     Last A1c: 5.5% (25)    HPI  DIABETES MELLITUS TYPE 2:    Diagnosed with diabetes:  2025  Known diabetic complications: N/A  Does patient follow with Endocrinology: N/A  Last optometry exam: ~ a year, due for one  Last foot exam: Does have a podiatrist -- patient denies sores or cuts on feet today      Current diabetic medications include:  Mounjaro 5 mg once weekly ()  Last date taken: 25  Remaining doses: 0  Adherence: denies  Adverse effects: constipation but mild and manageable  Reports that beneficial effects of Mounjaro for weight loss has seemed to have tapered off.     Past diabetic medications include: N/A    Patient reported weights:  25: 241 lbs  25: 237.4 lbs  25: 237.8 lbs.   25: 232 lbs.   25: 231.8 lbs.    Glucose Readings:  Glucometer/CGM Type: Insurance had denied coverage of CGM - patient agreeable to use glucometer  Uses FSL 3 plus:       Previous home BG readings:   Fasting Bs-90s  After meals 110s -130s (130s if eating a lot of carbs)    Any episodes of hypoglycemia? Denies  Did patient treat episode of hypoglycemia appropriately? N/A  Does the patient have a " prescription for ready-to-use Glucagon? N/a    Does pt have proteinuria? N/A    Lifestyle:   Diet: 3 meals/day: Following [employer] Food plan  Has been trying to follow a diet plan - interested in nutritionist, referral ordered today  Weight watchers, food plan  Breakfast: Non-fat greek yogurt with berries, or granola bar (100 pam) or banana  Lunch: Turkey sandwich on whole wheat / egg wrap or a salad  Dinner: Salad with chicken  Snacks: will snack throughout the day and at night  - mozzarella pearls, skinny Pop (individual size), fruit  Drinks: Water, coffee (skim milk), tea    Physical Activity: walking more at least 2x week   usually will walk when weather is good, had some back issues    Primary/Secondary Prevention:  Statin? No  ACE-I/ARB? No  Aspirin? No    Pertinent PMH Review:  PMH of Pancreatitis: No  PMH of Retinopathy: No  PMH of Urinary Tract Infections: Yes - but nothing recent and rare  PMH of MTC/MEN2: No  PMH of gastroparesis/severe GI diseases: No    Immunizations:  Influenza? No  COVID? Yes  Pneumonia? Yes  Shingles? Yes  Hepatitis B? No    Medication Reconciliation:  No changes made at this encounter    Drug Interactions  No relevant drug interactions were noted.  Will note that patient on Abilify for depression and anxiety - currently no concerns and controlled  Per Martine - can be associated with weight gain and increase serum glucose    Medication System Management  Patient's preferred pharmacy:   [facility] - [place], [place] - [facility] [address] [address]  Phone: [phone] Fax: [phone]    [facility]/pharmacy #[id number] [place], [place] - [address] [address]  Phone: [phone] Fax: [phone]    [facility]  [address] [place] [address], [address]  [facility] [address]  Phone: [phone] Fax: [phone]      Objective   Allergies   Allergen Reactions    Bee Pollen Unknown    House Dust Unknown     Methimazole Other     Never been tested, not 100% sure if they are allergic and has had the flu shot in the past.    Mold Unknown    Other Unknown     Pollen    Pseudoephedrine Unknown    Sudafed [Pseudoephedrine Hcl] Other    Sulfa (Sulfonamide Antibiotics) Unknown    Thimerosal Other     Not sure    Naproxen Rash     Social History     Social History Narrative    Not on file      Medication Review  Current Outpatient Medications   Medication Instructions    acebutolol (SECTRAL) 200 mg, oral, Daily    amLODIPine (Norvasc) 5 mg tablet TAKE 1 TABLET ONCE DAILY FOR ESSENTIAL HYPERTENSION    ARIPiprazole (ABILIFY) 7.5 mg, Daily    BIOTIN ORAL 10 mg    blood sugar diagnostic (Blood Glucose Test) strip Test blood sugar at least once a day - fasting or 2 hours after a meal    blood-glucose meter misc Test blood sugar at least once a day - fasting or 2 hours after a meal    blood-glucose sensor (FreeStyle Scott 3 Plus Sensor) device Use as directed    buPROPion XL (WELLBUTRIN XL) 300 mg, Daily    cholecalciferol (VITAMIN D-3) 50 mcg, Daily    CHONDROITIN SULFATE A ORAL 1 capsule, Daily    coenzyme Q-10 200 mg capsule 1 capsule, Daily    dextromethorphan-guaifenesin (MUCINEX DM) 60-1,200 mg tablet extended release 12 hr Take by mouth.    escitalopram (Lexapro) 20 mg tablet 1 tablet, Daily    fexofenadine (Allegra) 180 mg tablet Take by mouth. For health maintenace    fluticasone (Flonase) 50 mcg/actuation nasal spray Administer into affected nostril(s). For health maintenace    FreeStyle Scott 3 [place] misc Use as instructed    lancets misc Test blood sugar at least once a day - fasting or 2 hours after a meal    Mounjaro 7.5 mg, subcutaneous, Weekly      Vitals  BP Readings from Last 2 Encounters:   06/05/25 115/74   02/20/25 116/71     BMI Readings from Last 1 Encounters:   06/05/25 34.23 kg/m²      Labs  A1C  Lab Results   Component Value Date    HGBA1C 5.5 06/05/2025    HGBA1C 6.5 02/17/2025    HGBA1C 5.9 11/14/2024      BMP  Lab Results   Component Value Date    CALCIUM 9.9 08/05/2024     08/05/2024    K 4.5 08/05/2024    CO2 31 08/05/2024     08/05/2024    BUN 17 08/05/2024    CREATININE 1.16 (H) 08/05/2024    EGFR 53 (L) 08/05/2024     LFTs  Lab Results   Component Value Date    ALT 26 08/05/2024    AST 17 08/05/2024    ALKPHOS 113 08/05/2024    BILITOT 0.7 08/05/2024     FLP  Lab Results   Component Value Date    TRIG 174 (H) 08/05/2024    CHOL 184 08/05/2024    LDLF 84 08/02/2023    LDLCALC 103 (H) 08/05/2024    HDL 46.7 08/05/2024       Weight Management  Wt Readings from Last 3 Encounters:   06/05/25 105 kg (231 lb 12.8 oz)   02/20/25 110 kg (242 lb)   02/17/25 109 kg (241 lb 3.2 oz)       Assessment/Plan   Problem List Items Addressed This Visit       Type 2 diabetes mellitus without complication, without long-term current use of insulin    Patient's goal A1c is < 7%.  Patient is at goal with most recent A1c of 5.5% on 06/05/25.  Patient's SMBGs are well controlled based on most recent CGM report:   [facility] Report Date: 05/24/25 - 06/06/25  CGM Active: 93%  TIR: 100%  Average glucose: 102 mg/dL  GMI: 5.7%      Patient denied any issues with Mounjaro and confirmed taking it as directed. Reported some mild constipation, but is manageable.   Patient has been following Weight [employer] food plan and has increased her exercise as well.  Reported appetite suppression is not as significant and weight loss seems to have peaked at current dose.     Rationale for plan:   Due to patient's diabetes being well controlled with Mounjaro and reporting little to no side effects, will INCREASE Mounjaro to 7.5 mg for additional weight loss benefit.   Discussed common side effects associated with dose increase and how to mitigate them.   Patient is aware she can contact clinical pharmacy team for any issues or concerns that arise prior to next follow up.   Will follow up in 4 weeks to assess medication tolerance and discuss dose  increase if appropriate.     Medication Changes:  INCREASE  Mounjaro 7.5 mg injected under the skin one weekly (increased from 5 mg)         Relevant Medications    tirzepatide (Mounjaro) 7.5 mg/0.5 mL pen injector    Other Relevant Orders    Referral to Clinical Pharmacy     Clinical Pharmacist follow-up: 07/08/25, Telehealth visit  Continue all meds under the continuation of care with the referring provider and clinical pharmacy team.    Thank you,  Vanessa Nichole (Suji), PharmD  PGY-1  Meds Pharmacy Resident     Verbal consent to manage patient's drug therapy was obtained from the patient. They were informed they may decline to participate or withdraw from participation in pharmacy services at any time.

## 2025-06-06 ENCOUNTER — APPOINTMENT (OUTPATIENT)
Dept: PHARMACY | Facility: HOSPITAL | Age: 65
End: 2025-06-06
Payer: COMMERCIAL

## 2025-06-06 DIAGNOSIS — E11.9 TYPE 2 DIABETES MELLITUS WITHOUT COMPLICATION, WITHOUT LONG-TERM CURRENT USE OF INSULIN: ICD-10-CM

## 2025-06-06 PROCEDURE — RXMED WILLOW AMBULATORY MEDICATION CHARGE

## 2025-06-06 RX ORDER — TIRZEPATIDE 7.5 MG/.5ML
7.5 INJECTION, SOLUTION SUBCUTANEOUS WEEKLY
Qty: 2 ML | Refills: 0 | Status: SHIPPED | OUTPATIENT
Start: 2025-06-06

## 2025-06-06 NOTE — ASSESSMENT & PLAN NOTE
Patient's goal A1c is < 7%.  Patient is at goal with most recent A1c of 5.5% on 06/05/25.  Patient's SMBGs are well controlled based on most recent CGM report:   [facility] Report Date: 05/24/25 - 06/06/25  CGM Active: 93%  TIR: 100%  Average glucose: 102 mg/dL  GMI: 5.7%      Patient denied any issues with Mounjaro and confirmed taking it as directed. Reported some mild constipation, but is manageable.   Patient has been following [employer] food plan and has increased her exercise as well.  Reported appetite suppression is not as significant and weight loss seems to have peaked at current dose.     Rationale for plan:   Due to patient's diabetes being well controlled with Mounjaro and reporting little to no side effects, will INCREASE Mounjaro to 7.5 mg for additional weight loss benefit.   Discussed common side effects associated with dose increase and how to mitigate them.   Patient is aware she can contact clinical pharmacy team for any issues or concerns that arise prior to next follow up.   Will follow up in 4 weeks to assess medication tolerance and discuss dose increase if appropriate.     Medication Changes:  INCREASE  Mounjaro 7.5 mg injected under the skin one weekly (increased from 5 mg)

## 2025-06-11 ENCOUNTER — PHARMACY VISIT (OUTPATIENT)
Dept: PHARMACY | Facility: CLINIC | Age: 65
End: 2025-06-11
Payer: COMMERCIAL

## 2025-07-08 ENCOUNTER — APPOINTMENT (OUTPATIENT)
Dept: PHARMACY | Facility: HOSPITAL | Age: 65
End: 2025-07-08
Payer: COMMERCIAL

## 2025-07-08 DIAGNOSIS — E11.9 TYPE 2 DIABETES MELLITUS WITHOUT COMPLICATION, WITHOUT LONG-TERM CURRENT USE OF INSULIN: Primary | ICD-10-CM

## 2025-07-08 PROCEDURE — RXMED WILLOW AMBULATORY MEDICATION CHARGE

## 2025-07-08 RX ORDER — TIRZEPATIDE 7.5 MG/.5ML
7.5 INJECTION, SOLUTION SUBCUTANEOUS WEEKLY
Qty: 2 ML | Refills: 0 | Status: SHIPPED | OUTPATIENT
Start: 2025-07-08

## 2025-07-08 NOTE — PROGRESS NOTES
"  Clinical Pharmacy Appointment    Patient ID: Romi Vicente \"Thai" is a 65 y.o. female who presents for diabetes management.     Pt is here for Follow-Up appointment.     Referring Provider: Gwen Coronado MD  PCP: Gwen Coronado MD   Last visit with PCP: 06/05/25  Next visit with PCP: 09/04/25    Subjective     Interval History  POC A1c 6.5% on 02/17/25, pt was started on Mounjaro 2.5 mg once weekly. ePA sent and approved. Patient has relevant medical hx of moderate HONEY with an AHI 27.9 (PCP note 05/02/24), HTN, and BMI of 35.22 (02/20/2025).     At last clinical pharmacy encounter (05/09/25) - patient reported some mild constipation  but otherwise was tolerating the Mounjaro fine. Her Mounjaro was titrated up to the 7.5 mg dose for additional weight loss.     Last A1c: 5.5% (06/05/25)    HPI  DIABETES MELLITUS TYPE 2:    Diagnosed with diabetes:  02/2025  Known diabetic complications: N/A  Does patient follow with Endocrinology: N/A  Last optometry exam: ~ a year, due for one  Last foot exam: Does have a podiatrist -- patient denies sores or cuts on feet today      Current diabetic medications include:  Mounjaro 7.5 mg once weekly (Fridays)  Remaining doses: 0  Adverse effects: notes continued mild constipation, has not had to use any laxatives to help; denies any other side effects     Past diabetic medications include: N/A    Patient reported weights:  02/21/25: 241 lbs  03/14/25: 237.4 lbs  04/04/25: 237.8 lbs.   05/09/25: 232 lbs.   06/06/25: 231.8 lbs.  07/08/25: 221 lbs     Glucose Readings:  Glucometer/CGM Type: Uses FSL 3 plus         Previous home BG readings:       Any episodes of hypoglycemia? Denies any signs/symptoms of hypoglycemia   Did patient treat episode of hypoglycemia appropriately? N/A  Does the patient have a prescription for ready-to-use Glucagon? N/a    Does pt have proteinuria? N/A    Lifestyle:   Diet: 3 meals/day: no changes reported since last encounter  Has been trying to " follow a diet plan - interested in nutritionist, referral ordered today  Weight watchers, food plan  Breakfast: Non-fat greek yogurt with berries, or granola bar (100 pam) or banana  Lunch: Turkey sandwich on whole wheat / egg wrap or a salad  Dinner: Salad with chicken  Snacks: will snack throughout the day and at night  - mozzarella pearls, skinny Pop (individual size), fruit  Drinks: Water, coffee (skim milk), tea    Physical Activity: no changes reported since last encounter  usually will walk when weather is good, had some back issues  Tried to walk at least 2 times per week     Primary/Secondary Prevention:  Statin? No  ACE-I/ARB? No  Aspirin? No    Pertinent PMH Review:  PMH of Pancreatitis: No  PMH of Retinopathy: No  PMH of Urinary Tract Infections: Yes - but nothing recent and rare  PMH of MTC/MEN2: No  PMH of gastroparesis/severe GI diseases: No    Immunizations:  Influenza? No  COVID? Yes  Pneumonia? Yes  Shingles? Yes  Hepatitis B? No    Medication Reconciliation:  No changes made at this encounter    Drug Interactions  No relevant drug interactions were noted.  Will note that patient on Abilify for depression and anxiety - currently no concerns and controlled  Per Martine - can be associated with weight gain and increase serum glucose    Medication System Management  Patient's preferred pharmacy:   [facility] - [address] [address]  Phone: [phone] Fax: [phone]    [facility]/pharmacy #[id number] - [place], [place] - [address] [id number]  Phone: [phone] Fax: [phone]    [facility]  [address], [address]  [facility] [address]  Phone: [phone] Fax: [phone]      Objective   Allergies   Allergen Reactions    Bee Pollen Unknown    House Dust Unknown    Methimazole Other     Never been tested, not 100% sure if they are allergic and has had the flu shot in the past.     Mold Unknown    Other Unknown     Pollen    Pseudoephedrine Unknown    Sudafed [Pseudoephedrine Hcl] Other    Sulfa (Sulfonamide Antibiotics) Unknown    Thimerosal Other     Not sure    Naproxen Rash     Social History     Social History Narrative    Not on file      Medication Review  Current Outpatient Medications   Medication Instructions    acebutolol (SECTRAL) 200 mg, oral, Daily    amLODIPine (Norvasc) 5 mg tablet TAKE 1 TABLET ONCE DAILY FOR ESSENTIAL HYPERTENSION    ARIPiprazole (ABILIFY) 7.5 mg, Daily    BIOTIN ORAL 10 mg    blood sugar diagnostic (Blood Glucose Test) strip Test blood sugar at least once a day - fasting or 2 hours after a meal    blood-glucose meter misc Test blood sugar at least once a day - fasting or 2 hours after a meal    blood-glucose sensor (FreeStyle Scott 3 Plus Sensor) device Use as directed    buPROPion XL (WELLBUTRIN XL) 300 mg, Daily    cholecalciferol (VITAMIN D-3) 50 mcg, Daily    CHONDROITIN SULFATE A ORAL 1 capsule, Daily    coenzyme Q-10 200 mg capsule 1 capsule, Daily    dextromethorphan-guaifenesin (MUCINEX DM) 60-1,200 mg tablet extended release 12 hr Take by mouth.    escitalopram (Lexapro) 20 mg tablet 1 tablet, Daily    fexofenadine (Allegra) 180 mg tablet Take by mouth. For health maintenace    fluticasone (Flonase) 50 mcg/actuation nasal spray Administer into affected nostril(s). For health maintenace    FreeStyle Scott 3 [place] misc Use as instructed    lancets misc Test blood sugar at least once a day - fasting or 2 hours after a meal    Mounjaro 7.5 mg, subcutaneous, Weekly      Vitals  BP Readings from Last 2 Encounters:   06/05/25 115/74   02/20/25 116/71     BMI Readings from Last 1 Encounters:   06/05/25 34.23 kg/m²      Labs  A1C  Lab Results   Component Value Date    HGBA1C 5.5 06/05/2025    HGBA1C 6.5 02/17/2025    HGBA1C 5.9 11/14/2024     BMP  Lab Results   Component Value Date    CALCIUM 9.9 08/05/2024     08/05/2024    K 4.5 08/05/2024    CO2 31  08/05/2024     08/05/2024    BUN 17 08/05/2024    CREATININE 1.16 (H) 08/05/2024    EGFR 53 (L) 08/05/2024     LFTs  Lab Results   Component Value Date    ALT 26 08/05/2024    AST 17 08/05/2024    ALKPHOS 113 08/05/2024    BILITOT 0.7 08/05/2024     FLP  Lab Results   Component Value Date    TRIG 174 (H) 08/05/2024    CHOL 184 08/05/2024    LDLF 84 08/02/2023    LDLCALC 103 (H) 08/05/2024    HDL 46.7 08/05/2024       Weight Management  Wt Readings from Last 3 Encounters:   06/05/25 105 kg (231 lb 12.8 oz)   02/20/25 110 kg (242 lb)   02/17/25 109 kg (241 lb 3.2 oz)       Assessment/Plan   Problem List Items Addressed This Visit       Type 2 diabetes mellitus without complication, without long-term current use of insulin - Primary    Patient's goal A1c is < 7%.  Is pt at goal? Yes, most recent A1c was 5.5% on 06/05/25  Patient's SMBGs are in good range. Time in range is 99% which is well above goal of >70%.       Rationale for plan:   Blood sugar is currently in good range and at goal  Patient has tolerated Mounjaro dose increase well denying any new side effects, does note some continued mild constipation but it has been manageable   Starting weight: 242 lbs (02/20/25)  Current weight: 221 lbs  Has lost 21 lbs (8.7% total body weight) since starting Mounjaro and has lost ~9 lbs over the past 4 weeks   Due to significant weight loss with the 7.5 mg dose of Mounjaro, will continue at the same dose     Medication Changes:  CONTINUE  Mounjaro 7.5 mg under the skin once weekly     Future Considerations:  Could titrate Mounjaro up to 10 mg once weekly if weight loss slows or needed for glycemic control in the future  If she continues to lose weight at the current dose will likely keep her at the 7.5 mg dose for a little while     Monitoring and Education:  Will continue to monitor for new/worsening side effects with Mounjaro therapy to ensure she is tolerating it well and ensure constipation is not worsening   Will  obtain updated home weight and go over updated Dexcom report at next follow-up to assess efficacy of therapy     We will follow-up in 4 weeks to see how she has done continuing on her current dose of Mounjaro for another month. She was encouraged to reach out with any questions and/or concerns prior to then.          Relevant Medications    tirzepatide (Mounjaro) 7.5 mg/0.5 mL pen injector    Other Relevant Orders    Referral to Clinical Pharmacy     Pharmacy Follow-Up: 08/05/2025   PCP Follow-Up: 09/04/2025    Continue all meds under the continuation of care with the referring provider and clinical pharmacy team.    Thank you,    Rigo Frank, PharmD   Clinical Pharmacist    Verbal consent to manage patient's drug therapy was obtained from the patient. They were informed they may decline to participate or withdraw from participation in pharmacy services at any time.

## 2025-07-08 NOTE — ASSESSMENT & PLAN NOTE
Patient's goal A1c is < 7%.  Is pt at goal? Yes, most recent A1c was 5.5% on 06/05/25  Patient's SMBGs are in good range. Time in range is 99% which is well above goal of >70%.       Rationale for plan:   Blood sugar is currently in good range and at goal  Patient has tolerated Mounjaro dose increase well denying any new side effects, does note some continued mild constipation but it has been manageable   Starting weight: 242 lbs (02/20/25)  Current weight: 221 lbs  Has lost 21 lbs (8.7% total body weight) since starting Mounjaro and has lost ~9 lbs over the past 4 weeks   Due to significant weight loss with the 7.5 mg dose of Mounjaro, will continue at the same dose     Medication Changes:  CONTINUE  Mounjaro 7.5 mg under the skin once weekly     Future Considerations:  Could titrate Mounjaro up to 10 mg once weekly if weight loss slows or needed for glycemic control in the future  If she continues to lose weight at the current dose will likely keep her at the 7.5 mg dose for a little while     Monitoring and Education:  Will continue to monitor for new/worsening side effects with Mounjaro therapy to ensure she is tolerating it well and ensure constipation is not worsening   Will obtain updated home weight and go over updated Dexcom report at next follow-up to assess efficacy of therapy     We will follow-up in 4 weeks to see how she has done continuing on her current dose of Mounjaro for another month. She was encouraged to reach out with any questions and/or concerns prior to then.

## 2025-07-09 ENCOUNTER — PHARMACY VISIT (OUTPATIENT)
Dept: PHARMACY | Facility: CLINIC | Age: 65
End: 2025-07-09
Payer: COMMERCIAL

## 2025-07-10 DIAGNOSIS — I10 ESSENTIAL HYPERTENSION: ICD-10-CM

## 2025-07-10 RX ORDER — ACEBUTOLOL HYDROCHLORIDE 200 MG/1
200 CAPSULE ORAL DAILY
Qty: 90 CAPSULE | Refills: 3 | Status: SHIPPED | OUTPATIENT
Start: 2025-07-10

## 2025-08-04 NOTE — PROGRESS NOTES
"  Clinical Pharmacy Appointment    Patient ID: Romi Vicente \"Thai" is a 65 y.o. female who presents for diabetes management.     Pt is here for Follow-Up appointment.     Referring Provider: Gwen Coronado MD  PCP: Gwen Coronado MD   Last visit with PCP: 06/05/2025  Next visit with PCP: 09/04/2025    Subjective     Interval History  POC A1c 6.5% on 02/17/25, pt was started on Mounjaro 2.5 mg once weekly. ePA sent and approved. Patient has relevant medical hx of moderate HONEY with an AHI 27.9 (PCP note 05/02/24), HTN, and BMI of 35.22 (02/20/2025).     At last pharmacy encounter, patient had continued to tolerate Mounjaro generally well noting just mild constipation that was manageable. She was continuing to see weight loss with the 7.5 mg dose of Mounjaro so she was continued on it for another month. We are following-up today to see how she has done over the past month.     Today patient notes that her weight last month was actually 131 lbs, not 121 lbs as originally reported.      Last A1c: 5.5% (06/05/25)    HPI  DIABETES MELLITUS TYPE 2:    Diagnosed with diabetes:  02/2025  Known diabetic complications: N/A  Does patient follow with Endocrinology: N/A  Last optometry exam: ~ a year, due for one  Last foot exam: Does have a podiatrist -- patient denies sores or cuts on feet today      Current diabetic medications include:  Mounjaro 7.5 mg once weekly (Fridays)  Remaining doses: 1   Adverse effects: no new side effects reported, constipation comes and goes and is mild and manageable when she does have it      Past diabetic medications include: N/A    Patient reported weights:  02/21/25: 241 lbs  03/14/25: 237.4 lbs  04/04/25: 237.8 lbs.   05/09/25: 232 lbs.   06/06/25: 231.8 lbs.  07/08/25: 231 lbs   08/05/25: 228 lbs     Glucose Readings:  Glucometer/CGM Type: Uses FSL 3 plus           Previous home BG readings:       Any episodes of hypoglycemia? Denies any signs/symptoms of hypoglycemia, Scott reported " 3% low     Does pt have proteinuria? N/A    Lifestyle:   Diet: 3 meals/day:   No changes reported today   Has been trying to follow a diet plan - interested in nutritionist, referral ordered today  Weight watchers, food plan  Breakfast: Non-fat greek yogurt with berries, or granola bar (100 pam) or banana  Lunch: Turkey sandwich on whole wheat / egg wrap or a salad  Dinner: Salad with chicken  Snacks: will snack throughout the day and at night  - mozzarella pearls, skinny Pop (individual size), fruit  Drinks: Water, coffee (skim milk), tea    Physical Activity:   No changes reported today   Usually will walk when weather is good, had some back issues  Tried to walk at least 2 times per week     Primary/Secondary Prevention:  Statin? No  ACE-I/ARB? No  Aspirin? No    Pertinent PMH Review:  PMH of Pancreatitis: No  PMH of Retinopathy: No  PMH of Urinary Tract Infections: Yes - but nothing recent and rare  PMH of MTC/MEN2: No  PMH of gastroparesis/severe GI diseases: No    Immunizations:  Influenza? No  COVID? Yes  Pneumonia? Yes  Shingles? Yes  Hepatitis B? No    Drug Interactions  No relevant drug interactions were noted.  Will note that patient on Abilify for depression and anxiety - currently no concerns and controlled  Per Martine - can be associated with weight gain and increase serum glucose    Medication System Management  Patient's preferred pharmacy:   [facility] - [address] [address]  Phone: [phone] Fax: [phone]    [facility]pharmacy #[id number] [place], [place] - [address] [facility]  [address] [id number]  Phone: [phone] Fax: [phone]    [facility]  [address] [place] [address], [address]  [facility] [address]  Phone: [phone] Fax: [phone]      Objective   Allergies   Allergen Reactions    Bee Pollen Unknown    House Dust Unknown    Methimazole Other     Never been tested, not 100% sure if they are  allergic and has had the flu shot in the past.    Mold Unknown    Other Unknown     Pollen    Pseudoephedrine Unknown    Sudafed [Pseudoephedrine Hcl] Other    Sulfa (Sulfonamide Antibiotics) Unknown    Thimerosal Other     Not sure    Naproxen Rash     Social History     Social History Narrative    Not on file     Medication Reconciliation:  No changes reported by patient today     Medication Review  Current Outpatient Medications   Medication Instructions    acebutolol (SECTRAL) 200 mg, oral, Daily    amLODIPine (Norvasc) 5 mg tablet TAKE 1 TABLET ONCE DAILY FOR ESSENTIAL HYPERTENSION    ARIPiprazole (ABILIFY) 7.5 mg, Daily    BIOTIN ORAL 10 mg    blood sugar diagnostic (Blood Glucose Test) strip Test blood sugar at least once a day - fasting or 2 hours after a meal    blood-glucose meter misc Test blood sugar at least once a day - fasting or 2 hours after a meal    blood-glucose sensor (FreeStyle Scott 3 Plus Sensor) device Use as directed    buPROPion XL (WELLBUTRIN XL) 300 mg, Daily    cholecalciferol (VITAMIN D-3) 50 mcg, Daily    CHONDROITIN SULFATE A ORAL 1 capsule, Daily    coenzyme Q-10 200 mg capsule 1 capsule, Daily    dextromethorphan-guaifenesin (MUCINEX DM) 60-1,200 mg tablet extended release 12 hr Take by mouth.    escitalopram (Lexapro) 20 mg tablet 1 tablet, Daily    fexofenadine (Allegra) 180 mg tablet Take by mouth. For health maintenace    fluticasone (Flonase) 50 mcg/actuation nasal spray Administer into affected nostril(s). For health maintenace    FreeStyle Scott 3 [place] misc Use as instructed    lancets misc Test blood sugar at least once a day - fasting or 2 hours after a meal    Mounjaro 10 mg, subcutaneous, Weekly      Vitals  BP Readings from Last 2 Encounters:   06/05/25 115/74   02/20/25 116/71     BMI Readings from Last 1 Encounters:   06/05/25 34.23 kg/m²      Labs  A1C  Lab Results   Component Value Date    HGBA1C 5.5 06/05/2025    HGBA1C 6.5 02/17/2025    HGBA1C 5.9 11/14/2024      BMP  Lab Results   Component Value Date    CALCIUM 9.9 08/05/2024     08/05/2024    K 4.5 08/05/2024    CO2 31 08/05/2024     08/05/2024    BUN 17 08/05/2024    CREATININE 1.16 (H) 08/05/2024    EGFR 53 (L) 08/05/2024     LFTs  Lab Results   Component Value Date    ALT 26 08/05/2024    AST 17 08/05/2024    ALKPHOS 113 08/05/2024    BILITOT 0.7 08/05/2024     FLP  Lab Results   Component Value Date    TRIG 174 (H) 08/05/2024    CHOL 184 08/05/2024    LDLF 84 08/02/2023    LDLCALC 103 (H) 08/05/2024    HDL 46.7 08/05/2024     Weight Management  Wt Readings from Last 3 Encounters:   06/05/25 105 kg (231 lb 12.8 oz)   02/20/25 110 kg (242 lb)   02/17/25 109 kg (241 lb 3.2 oz)       Assessment/Plan   Problem List Items Addressed This Visit       Type 2 diabetes mellitus without complication, without long-term current use of insulin - Primary    Patient's goal A1c is < 7%.  Is pt at goal? Yes, most recent A1c was 5.5% on 06/05/25  Patient's SMBGs are in good range. Time in range is 97% which is well above goal of >70%. Average blood sugar 96 mg/dL and GMI 5.6%.        Rationale for plan:   Blood sugar is currently in good range and at goal  Patient has tolerated Mounjaro well at 7.5 mg over the last month, constipation comes and goes and has remained manageable   Starting weight: 242 lbs (02/20/25)  Current weight: 228 lbs  Has lost 14 lbs (5.8% total body weight) since starting Mounjaro  Since patient is tolerating the medication well and weight loss has slowed, will plan to titrate up to the next dose of Mounjaro     Medication Changes:  INCREASE  Mounjaro 10 mg under the skin once weekly (increased from 7.5 mg)  Will give last 7.5 mg dose this Friday 08/08  Will increase up to 10 mg effective with dose on 08/15    Future Considerations:  Could titrate Mounjaro up to 12.5 mg once weekly if weight loss continues to be slow   If she sees significant weight loss with the 10 mg dose will likely continue  there until weight loss slows     Monitoring and Education:  Will continue to monitor for new/worsening side effects with Mounjaro therapy to ensure she is tolerating it well with dose increase  Will obtain updated home weight and go over updated Scott report at next follow-up to assess efficacy of therapy     We will follow-up in 4 weeks to see how she has done with the increased dose of Mounjaro. She was encouraged to reach out with any questions and/or concerns prior to then.          Relevant Medications    tirzepatide (Mounjaro) 10 mg/0.5 mL pen injector    Other Relevant Orders    Referral to Clinical Pharmacy     Pharmacy Follow-Up: 09/02/2025   PCP Follow-Up: 09/04/2025    Continue all meds under the continuation of care with the referring provider and clinical pharmacy team.    Thank you,    Rigo Frank, PharmD   Clinical Pharmacist    Verbal consent to manage patient's drug therapy was obtained from the patient. They were informed they may decline to participate or withdraw from participation in pharmacy services at any time.

## 2025-08-05 ENCOUNTER — APPOINTMENT (OUTPATIENT)
Dept: PHARMACY | Facility: HOSPITAL | Age: 65
End: 2025-08-05
Payer: COMMERCIAL

## 2025-08-05 DIAGNOSIS — E11.9 TYPE 2 DIABETES MELLITUS WITHOUT COMPLICATION, WITHOUT LONG-TERM CURRENT USE OF INSULIN: Primary | ICD-10-CM

## 2025-08-05 PROCEDURE — RXMED WILLOW AMBULATORY MEDICATION CHARGE

## 2025-08-05 RX ORDER — TIRZEPATIDE 10 MG/.5ML
10 INJECTION, SOLUTION SUBCUTANEOUS WEEKLY
Qty: 2 ML | Refills: 0 | Status: SHIPPED | OUTPATIENT
Start: 2025-08-05

## 2025-08-05 NOTE — ASSESSMENT & PLAN NOTE
Patient's goal A1c is < 7%.  Is pt at goal? Yes, most recent A1c was 5.5% on 06/05/25  Patient's SMBGs are in good range. Time in range is 97% which is well above goal of >70%. Average blood sugar 96 mg/dL and GMI 5.6%.        Rationale for plan:   Blood sugar is currently in good range and at goal  Patient has tolerated Mounjaro well at 7.5 mg over the last month, constipation comes and goes and has remained manageable   Starting weight: 242 lbs (02/20/25)  Current weight: 228 lbs  Has lost 14 lbs (5.8% total body weight) since starting Mounjaro  Since patient is tolerating the medication well and weight loss has slowed, will plan to titrate up to the next dose of Mounjaro     Medication Changes:  INCREASE  Mounjaro 10 mg under the skin once weekly (increased from 7.5 mg)  Will give last 7.5 mg dose this Friday 08/08  Will increase up to 10 mg effective with dose on 08/15    Future Considerations:  Could titrate Mounjaro up to 12.5 mg once weekly if weight loss continues to be slow   If she sees significant weight loss with the 10 mg dose will likely continue there until weight loss slows     Monitoring and Education:  Will continue to monitor for new/worsening side effects with Mounjaro therapy to ensure she is tolerating it well with dose increase  Will obtain updated home weight and go over updated Scott report at next follow-up to assess efficacy of therapy     We will follow-up in 4 weeks to see how she has done with the increased dose of Mounjaro. She was encouraged to reach out with any questions and/or concerns prior to then.

## 2025-08-07 ENCOUNTER — PHARMACY VISIT (OUTPATIENT)
Dept: PHARMACY | Facility: CLINIC | Age: 65
End: 2025-08-07
Payer: COMMERCIAL

## 2025-08-31 LAB
ALBUMIN SERPL-MCNC: 4.1 G/DL (ref 3.6–5.1)
ALBUMIN/CREAT UR: NORMAL
ALP SERPL-CCNC: 97 U/L (ref 37–153)
ALT SERPL-CCNC: 23 U/L (ref 6–29)
ANION GAP SERPL CALCULATED.4IONS-SCNC: 9 MMOL/L (CALC) (ref 7–17)
AST SERPL-CCNC: 15 U/L (ref 10–35)
BILIRUB SERPL-MCNC: 0.4 MG/DL (ref 0.2–1.2)
BUN SERPL-MCNC: 14 MG/DL (ref 7–25)
CALCIUM SERPL-MCNC: 8.9 MG/DL (ref 8.6–10.4)
CHLORIDE SERPL-SCNC: 105 MMOL/L (ref 98–110)
CHOLEST SERPL-MCNC: 145 MG/DL
CHOLEST/HDLC SERPL: 3.5 (CALC)
CO2 SERPL-SCNC: 29 MMOL/L (ref 20–32)
CREAT SERPL-MCNC: 0.96 MG/DL (ref 0.5–1.05)
CREAT UR-MCNC: NORMAL MG/DL
EGFRCR SERPLBLD CKD-EPI 2021: 66 ML/MIN/1.73M2
ERYTHROCYTE [DISTWIDTH] IN BLOOD BY AUTOMATED COUNT: 13.5 % (ref 11–15)
EST. AVERAGE GLUCOSE BLD GHB EST-MCNC: 111 MG/DL
EST. AVERAGE GLUCOSE BLD GHB EST-SCNC: 6.2 MMOL/L
GLUCOSE SERPL-MCNC: 89 MG/DL (ref 65–99)
HBA1C MFR BLD: 5.5 %
HCT VFR BLD AUTO: 45.3 % (ref 35–45)
HDLC SERPL-MCNC: 41 MG/DL
HGB BLD-MCNC: 14.3 G/DL (ref 11.7–15.5)
LDLC SERPL CALC-MCNC: 77 MG/DL (CALC)
MCH RBC QN AUTO: 29.2 PG (ref 27–33)
MCHC RBC AUTO-ENTMCNC: 31.6 G/DL (ref 32–36)
MCV RBC AUTO: 92.6 FL (ref 80–100)
MICROALBUMIN UR-MCNC: NORMAL
NONHDLC SERPL-MCNC: 104 MG/DL (CALC)
PLATELET # BLD AUTO: 210 THOUSAND/UL (ref 140–400)
PMV BLD REES-ECKER: 11.2 FL (ref 7.5–12.5)
POTASSIUM SERPL-SCNC: 4.1 MMOL/L (ref 3.5–5.3)
PROT SERPL-MCNC: 6.2 G/DL (ref 6.1–8.1)
RBC # BLD AUTO: 4.89 MILLION/UL (ref 3.8–5.1)
SODIUM SERPL-SCNC: 143 MMOL/L (ref 135–146)
TRIGL SERPL-MCNC: 173 MG/DL
TSH SERPL-ACNC: 2.79 MIU/L (ref 0.4–4.5)
WBC # BLD AUTO: 5.2 THOUSAND/UL (ref 3.8–10.8)

## 2025-09-02 ENCOUNTER — APPOINTMENT (OUTPATIENT)
Dept: PHARMACY | Facility: HOSPITAL | Age: 65
End: 2025-09-02
Payer: COMMERCIAL

## 2025-09-02 LAB
ALBUMIN SERPL-MCNC: 4.1 G/DL (ref 3.6–5.1)
ALBUMIN/CREAT UR: 5 MG/G CREAT
ALP SERPL-CCNC: 97 U/L (ref 37–153)
ALT SERPL-CCNC: 23 U/L (ref 6–29)
ANION GAP SERPL CALCULATED.4IONS-SCNC: 9 MMOL/L (CALC) (ref 7–17)
AST SERPL-CCNC: 15 U/L (ref 10–35)
BILIRUB SERPL-MCNC: 0.4 MG/DL (ref 0.2–1.2)
BUN SERPL-MCNC: 14 MG/DL (ref 7–25)
CALCIUM SERPL-MCNC: 8.9 MG/DL (ref 8.6–10.4)
CHLORIDE SERPL-SCNC: 105 MMOL/L (ref 98–110)
CHOLEST SERPL-MCNC: 145 MG/DL
CHOLEST/HDLC SERPL: 3.5 (CALC)
CO2 SERPL-SCNC: 29 MMOL/L (ref 20–32)
CREAT SERPL-MCNC: 0.96 MG/DL (ref 0.5–1.05)
CREAT UR-MCNC: 57 MG/DL (ref 20–275)
EGFRCR SERPLBLD CKD-EPI 2021: 66 ML/MIN/1.73M2
ERYTHROCYTE [DISTWIDTH] IN BLOOD BY AUTOMATED COUNT: 13.5 % (ref 11–15)
EST. AVERAGE GLUCOSE BLD GHB EST-MCNC: 111 MG/DL
EST. AVERAGE GLUCOSE BLD GHB EST-SCNC: 6.2 MMOL/L
GLUCOSE SERPL-MCNC: 89 MG/DL (ref 65–99)
HBA1C MFR BLD: 5.5 %
HCT VFR BLD AUTO: 45.3 % (ref 35–45)
HDLC SERPL-MCNC: 41 MG/DL
HGB BLD-MCNC: 14.3 G/DL (ref 11.7–15.5)
LDLC SERPL CALC-MCNC: 77 MG/DL (CALC)
MCH RBC QN AUTO: 29.2 PG (ref 27–33)
MCHC RBC AUTO-ENTMCNC: 31.6 G/DL (ref 32–36)
MCV RBC AUTO: 92.6 FL (ref 80–100)
MICROALBUMIN UR-MCNC: 0.3 MG/DL
NONHDLC SERPL-MCNC: 104 MG/DL (CALC)
PLATELET # BLD AUTO: 210 THOUSAND/UL (ref 140–400)
PMV BLD REES-ECKER: 11.2 FL (ref 7.5–12.5)
POTASSIUM SERPL-SCNC: 4.1 MMOL/L (ref 3.5–5.3)
PROT SERPL-MCNC: 6.2 G/DL (ref 6.1–8.1)
RBC # BLD AUTO: 4.89 MILLION/UL (ref 3.8–5.1)
SODIUM SERPL-SCNC: 143 MMOL/L (ref 135–146)
TRIGL SERPL-MCNC: 173 MG/DL
TSH SERPL-ACNC: 2.79 MIU/L (ref 0.4–4.5)
WBC # BLD AUTO: 5.2 THOUSAND/UL (ref 3.8–10.8)

## 2025-09-02 PROCEDURE — RXMED WILLOW AMBULATORY MEDICATION CHARGE

## 2025-09-02 ASSESSMENT — PROMIS GLOBAL HEALTH SCALE
RATE_SOCIAL_SATISFACTION: VERY GOOD
RATE_PHYSICAL_HEALTH: FAIR
EMOTIONAL_PROBLEMS: RARELY
CARRYOUT_PHYSICAL_ACTIVITIES: COMPLETELY
RATE_GENERAL_HEALTH: GOOD
CARRYOUT_SOCIAL_ACTIVITIES: VERY GOOD
RATE_AVERAGE_PAIN: 2
RATE_AVERAGE_FATIGUE: MILD
RATE_MENTAL_HEALTH: GOOD
RATE_QUALITY_OF_LIFE: VERY GOOD

## 2025-09-04 ENCOUNTER — PHARMACY VISIT (OUTPATIENT)
Dept: PHARMACY | Facility: CLINIC | Age: 65
End: 2025-09-04
Payer: COMMERCIAL

## 2025-09-04 ENCOUNTER — APPOINTMENT (OUTPATIENT)
Dept: PRIMARY CARE | Facility: CLINIC | Age: 65
End: 2025-09-04
Payer: COMMERCIAL

## 2025-09-04 ASSESSMENT — PATIENT HEALTH QUESTIONNAIRE - PHQ9
SUM OF ALL RESPONSES TO PHQ9 QUESTIONS 1 AND 2: 0
2. FEELING DOWN, DEPRESSED OR HOPELESS: NOT AT ALL
1. LITTLE INTEREST OR PLEASURE IN DOING THINGS: NOT AT ALL

## 2025-09-04 ASSESSMENT — ENCOUNTER SYMPTOMS
DIFFICULTY URINATING: 0
ACTIVITY CHANGE: 0
LIGHT-HEADEDNESS: 0
VOMITING: 0
DIZZINESS: 0
CHEST TIGHTNESS: 0
COUGH: 0
BLOOD IN STOOL: 0
CONSTIPATION: 0
APPETITE CHANGE: 0
TREMORS: 0
NAUSEA: 0
UNEXPECTED WEIGHT CHANGE: 0
TROUBLE SWALLOWING: 0
HEADACHES: 0
HEMATURIA: 0
VOICE CHANGE: 0
FATIGUE: 0
WHEEZING: 0
DIARRHEA: 0
PALPITATIONS: 0
ARTHRALGIAS: 0

## 2025-09-29 ENCOUNTER — APPOINTMENT (OUTPATIENT)
Dept: PHARMACY | Facility: HOSPITAL | Age: 65
End: 2025-09-29
Payer: COMMERCIAL

## 2025-12-17 ENCOUNTER — APPOINTMENT (OUTPATIENT)
Dept: PRIMARY CARE | Facility: CLINIC | Age: 65
End: 2025-12-17
Payer: COMMERCIAL

## 2026-02-16 ENCOUNTER — APPOINTMENT (OUTPATIENT)
Facility: CLINIC | Age: 66
End: 2026-02-16
Payer: COMMERCIAL

## 2026-03-05 ENCOUNTER — APPOINTMENT (OUTPATIENT)
Dept: PRIMARY CARE | Facility: CLINIC | Age: 66
End: 2026-03-05
Payer: COMMERCIAL

## 2026-06-04 ENCOUNTER — APPOINTMENT (OUTPATIENT)
Dept: PRIMARY CARE | Facility: CLINIC | Age: 66
End: 2026-06-04
Payer: COMMERCIAL

## 2026-09-10 ENCOUNTER — APPOINTMENT (OUTPATIENT)
Dept: PRIMARY CARE | Facility: CLINIC | Age: 66
End: 2026-09-10
Payer: COMMERCIAL